# Patient Record
Sex: MALE | Race: WHITE | Employment: OTHER | URBAN - METROPOLITAN AREA
[De-identification: names, ages, dates, MRNs, and addresses within clinical notes are randomized per-mention and may not be internally consistent; named-entity substitution may affect disease eponyms.]

---

## 2019-08-04 ENCOUNTER — TRANSFERRED RECORDS (OUTPATIENT)
Dept: HEALTH INFORMATION MANAGEMENT | Facility: CLINIC | Age: 49
End: 2019-08-04

## 2019-08-05 ENCOUNTER — TRANSFERRED RECORDS (OUTPATIENT)
Dept: HEALTH INFORMATION MANAGEMENT | Facility: CLINIC | Age: 49
End: 2019-08-05

## 2019-08-05 LAB — INR PPP: 1.3 (ref 0.9–1.1)

## 2019-08-06 ENCOUNTER — TRANSFERRED RECORDS (OUTPATIENT)
Dept: HEALTH INFORMATION MANAGEMENT | Facility: CLINIC | Age: 49
End: 2019-08-06

## 2019-08-06 LAB — HBA1C MFR BLD: 8 % (ref 0–5.7)

## 2019-08-07 ENCOUNTER — TRANSFERRED RECORDS (OUTPATIENT)
Dept: HEALTH INFORMATION MANAGEMENT | Facility: CLINIC | Age: 49
End: 2019-08-07

## 2019-08-15 LAB — GLUCOSE SERPL-MCNC: 127 MG/DL (ref 70–100)

## 2019-08-16 ENCOUNTER — TRANSFERRED RECORDS (OUTPATIENT)
Dept: HEALTH INFORMATION MANAGEMENT | Facility: CLINIC | Age: 49
End: 2019-08-16

## 2019-08-16 LAB
CREAT SERPL-MCNC: 0.99 MG/DL (ref 0.73–1.18)
GFR SERPL CREATININE-BSD FRML MDRD: >60 ML/MIN/1.73M2
POTASSIUM SERPL-SCNC: 3.9 MMOL/L (ref 3.5–5.1)

## 2019-08-28 ENCOUNTER — TRANSFERRED RECORDS (OUTPATIENT)
Dept: HEALTH INFORMATION MANAGEMENT | Facility: CLINIC | Age: 49
End: 2019-08-28

## 2019-09-18 ENCOUNTER — TRANSFERRED RECORDS (OUTPATIENT)
Dept: HEALTH INFORMATION MANAGEMENT | Facility: CLINIC | Age: 49
End: 2019-09-18

## 2019-10-09 ENCOUNTER — OFFICE VISIT (OUTPATIENT)
Dept: FAMILY MEDICINE | Facility: CLINIC | Age: 49
End: 2019-10-09
Payer: COMMERCIAL

## 2019-10-09 VITALS
BODY MASS INDEX: 38.36 KG/M2 | HEIGHT: 76 IN | SYSTOLIC BLOOD PRESSURE: 138 MMHG | DIASTOLIC BLOOD PRESSURE: 80 MMHG | TEMPERATURE: 98.5 F | RESPIRATION RATE: 24 BRPM | WEIGHT: 315 LBS | HEART RATE: 80 BPM | OXYGEN SATURATION: 95 %

## 2019-10-09 DIAGNOSIS — D12.6 TUBULAR ADENOMA OF COLON: ICD-10-CM

## 2019-10-09 DIAGNOSIS — Z23 NEED FOR VACCINATION: ICD-10-CM

## 2019-10-09 DIAGNOSIS — S31.109S OPEN ABDOMINAL WALL WOUND, SEQUELA: ICD-10-CM

## 2019-10-09 DIAGNOSIS — M1A.9XX0 CHRONIC GOUT WITHOUT TOPHUS, UNSPECIFIED CAUSE, UNSPECIFIED SITE: Primary | ICD-10-CM

## 2019-10-09 DIAGNOSIS — R73.03 PREDIABETES: ICD-10-CM

## 2019-10-09 DIAGNOSIS — Z23 NEED FOR PROPHYLACTIC VACCINATION AND INOCULATION AGAINST INFLUENZA: ICD-10-CM

## 2019-10-09 DIAGNOSIS — K63.1 PERFORATION OF COLON (H): ICD-10-CM

## 2019-10-09 PROBLEM — E11.9 TYPE 2 DIABETES MELLITUS WITHOUT COMPLICATION, WITHOUT LONG-TERM CURRENT USE OF INSULIN (H): Status: ACTIVE | Noted: 2019-10-09

## 2019-10-09 LAB
ALBUMIN SERPL-MCNC: 3.1 G/DL (ref 3.4–5)
ALP SERPL-CCNC: 63 U/L (ref 40–150)
ALT SERPL W P-5'-P-CCNC: 26 U/L (ref 0–70)
ANION GAP SERPL CALCULATED.3IONS-SCNC: 4 MMOL/L (ref 3–14)
AST SERPL W P-5'-P-CCNC: 26 U/L (ref 0–45)
BILIRUB SERPL-MCNC: 0.3 MG/DL (ref 0.2–1.3)
BUN SERPL-MCNC: 13 MG/DL (ref 7–30)
CALCIUM SERPL-MCNC: 8.8 MG/DL (ref 8.5–10.1)
CHLORIDE SERPL-SCNC: 106 MMOL/L (ref 94–109)
CHOLEST SERPL-MCNC: 154 MG/DL
CO2 SERPL-SCNC: 28 MMOL/L (ref 20–32)
CREAT SERPL-MCNC: 0.72 MG/DL (ref 0.66–1.25)
GFR SERPL CREATININE-BSD FRML MDRD: >90 ML/MIN/{1.73_M2}
GLUCOSE SERPL-MCNC: 115 MG/DL (ref 70–99)
HBA1C MFR BLD: 5.8 % (ref 0–5.6)
HDLC SERPL-MCNC: 50 MG/DL
LDLC SERPL CALC-MCNC: 78 MG/DL
NONHDLC SERPL-MCNC: 104 MG/DL
POTASSIUM SERPL-SCNC: 4 MMOL/L (ref 3.4–5.3)
PROT SERPL-MCNC: 7.8 G/DL (ref 6.8–8.8)
SODIUM SERPL-SCNC: 138 MMOL/L (ref 133–144)
TRIGL SERPL-MCNC: 129 MG/DL
TSH SERPL DL<=0.005 MIU/L-ACNC: 3.08 MU/L (ref 0.4–4)
URATE SERPL-MCNC: 7.6 MG/DL (ref 3.5–7.2)

## 2019-10-09 PROCEDURE — 84550 ASSAY OF BLOOD/URIC ACID: CPT | Performed by: FAMILY MEDICINE

## 2019-10-09 PROCEDURE — 36415 COLL VENOUS BLD VENIPUNCTURE: CPT | Performed by: FAMILY MEDICINE

## 2019-10-09 PROCEDURE — 84443 ASSAY THYROID STIM HORMONE: CPT | Performed by: FAMILY MEDICINE

## 2019-10-09 PROCEDURE — 83036 HEMOGLOBIN GLYCOSYLATED A1C: CPT | Performed by: FAMILY MEDICINE

## 2019-10-09 PROCEDURE — 90471 IMMUNIZATION ADMIN: CPT | Performed by: FAMILY MEDICINE

## 2019-10-09 PROCEDURE — 90682 RIV4 VACC RECOMBINANT DNA IM: CPT | Performed by: FAMILY MEDICINE

## 2019-10-09 PROCEDURE — 80061 LIPID PANEL: CPT | Performed by: FAMILY MEDICINE

## 2019-10-09 PROCEDURE — 90715 TDAP VACCINE 7 YRS/> IM: CPT | Performed by: FAMILY MEDICINE

## 2019-10-09 PROCEDURE — 99204 OFFICE O/P NEW MOD 45 MIN: CPT | Mod: 25 | Performed by: FAMILY MEDICINE

## 2019-10-09 PROCEDURE — 80053 COMPREHEN METABOLIC PANEL: CPT | Performed by: FAMILY MEDICINE

## 2019-10-09 PROCEDURE — 90472 IMMUNIZATION ADMIN EACH ADD: CPT | Performed by: FAMILY MEDICINE

## 2019-10-09 RX ORDER — L. ACIDOPHILUS/PECTIN, CITRUS 25MM-100MG
1 TABLET ORAL
COMMUNITY
End: 2019-12-18

## 2019-10-09 RX ORDER — INDOMETHACIN 50 MG/1
50 CAPSULE ORAL 3 TIMES DAILY PRN
Qty: 90 CAPSULE | Refills: 1 | Status: SHIPPED | OUTPATIENT
Start: 2019-10-09

## 2019-10-09 RX ORDER — INDOMETHACIN 50 MG/1
50 CAPSULE ORAL 3 TIMES DAILY PRN
COMMUNITY
End: 2019-10-09

## 2019-10-09 ASSESSMENT — MIFFLIN-ST. JEOR: SCORE: 3206.81

## 2019-10-09 NOTE — LETTER
[facility]  [address] [facility] [address]  Phone: [phone]      10/10/2019     Jamel Louis  [address] [address]      Dear Jamel:    Thank you for allowing me to participate in your care. Your recent test results were reviewed and listed below.      Your results are provided below for your review    Results for orders placed or performed in visit on 10/09/19   Hemoglobin A1c   Result Value Ref Range    Hemoglobin A1C 5.8 (H) 0 - 5.6 %   Lipid panel reflex to direct LDL Non-fasting   Result Value Ref Range    Cholesterol 154 <200 mg/dL    Triglycerides 129 <150 mg/dL    HDL Cholesterol 50 >39 mg/dL    LDL Cholesterol Calculated 78 <100 mg/dL    Non HDL Cholesterol 104 <130 mg/dL   Comprehensive metabolic panel   Result Value Ref Range    Sodium 138 133 - 144 mmol/L    Potassium 4.0 3.4 - 5.3 mmol/L    Chloride 106 94 - 109 mmol/L    Carbon Dioxide 28 20 - 32 mmol/L    Anion Gap 4 3 - 14 mmol/L    Glucose 115 (H) 70 - 99 mg/dL    Urea Nitrogen 13 7 - 30 mg/dL    Creatinine 0.72 0.66 - 1.25 mg/dL    GFR Estimate >90 >60 mL/min/[1.73_m2]    GFR Estimate If Black >90 >60 mL/min/[1.73_m2]    Calcium 8.8 8.5 - 10.1 mg/dL    Bilirubin Total 0.3 0.2 - 1.3 mg/dL    Albumin 3.1 (L) 3.4 - 5.0 g/dL    Protein Total 7.8 6.8 - 8.8 g/dL    Alkaline Phosphatase 63 40 - 150 U/L    ALT 26 0 - 70 U/L    AST 26 0 - 45 U/L   TSH with free T4 reflex   Result Value Ref Range    TSH 3.08 0.40 - 4.00 mU/L   Uric acid   Result Value Ref Range    Uric Acid 7.6 (H) 3.5 - 7.2 mg/dL       Labs look overall good.   HgbA1c is down to 5.8% which is great improvement!     Uric acid is elevated at 7.6%.  If you're having gouty attacks more than a few times a year, it may be beneficial to be on Allopurinol again.     Kidney and liver function are normal, which is great!       Lipids look okay, I would not start medication at this time.            Thank you for choosing [facility]. As a  result, please continue with the treatment plan discussed in the office. Return as discussed or sooner if symptoms worsen or fail to improve.     If you have any further questions or concerns, please do not hesitate to contact us.      Sincerely,        Dr. Patricia Pedraza/otilio

## 2019-10-09 NOTE — PATIENT INSTRUCTIONS
Losing Weight:   QUICK START PATIENT GUIDE TO LCHF/IF (Ketogenic diet)  What is this diet and how does it work?  o Insulin is a hormone made by your body that allows you to use sugar (glucose) from carbohydrates in the food you eat for energy or to store glucose (as fat) for future use   o Insulin levels need to be lowered in order to utilize our stored energy (fat)  o Many struggling with obesity are insulin resistant and have high levels of insulin  o This diet works to lower your insulin in two ways  o Fasting - allows your insulin levels to naturally decrease   o Avoiding carbohydrates - carbs trigger increase in insulin  Low Carb Healthy Fat (LCHF)  o Get a free jackson for your phone, such as [employer], to help you track your macronutrients (carbs/protein/fats) and to track your weight and body measurements to see your progress  o Set your goal for around 10% carbs/20% protein/70% fat  o A good starting goal for amount of net carbs per day is 50 grams (some will aim for 20 grams)  o  Net carbs  refers to total grams of carbs minus grams of fiber (as fiber is not typically absorbed). For example, if a food has 5g total carb and 3g fiber, that would be 2g net carbs  o Increase healthy fats - eg. olive oil, eggs, nuts, avocado, cheese, butter, coconut, meats, fish  o Avoid high carb foods - eg. bread, pasta, potatoes, rice, cookies, soda, juice, anything sugary  o Buy full-fat ingredients (avoid low-fat versions, which often have more sugar)  o No need to count calories, but pay close attention to grams of carbs on labels  Intermittent Fasting (IF)  o  Fasting  is going a period of time without eating - it helps to stay busy and well-hydrated  o Purpose of fasting is to allow insulin levels to drop as low as possible, allowing your body to switch into fat-burning mode  o With this diet there are many approaches to fasting, but 16:8 and 24hr fasts are commonly used  o 16:8 fast, usually 5-7 days a week - Fasting  for 16 hours of the day, then eating all meals for the day over course of 8 hours.  o 24 hour fast, usually 1-3 days a week - Typically eating one meal a day, then fasting until the next day. Plenty of fluids (and some salt to help you hold onto fluids) are recommended during longer fasts.   o During fasts certain beverages are still acceptable - water, sparkling water, bone broth, black tea or coffee, or tea/coffee with small amount of heavy whipping cream  Special note for those on diabetic medications  o Discuss your medications with your physician. You may need to hold your medication or adjust to only taking when eating. Diabetics should keep close track of their blood sugars when making any changes to diet/meds, to ensure they are staying within normal limits  For more info about LCHF/IF  o Watch  Therapeutic Fasting - Solving the Two-Compartment Problem  video by Dr. Hari Baltazar on [employer] (https://www.[employer].com/watch?v=tIuj-oMN-Fk) for a great intro to these concepts  o Read  The Obesity Code  and/or  The Complete Guide to Fasting  by Dr. Hari Baltazar  o Go to www.dietdoctor.com for explanations, recipes, and infographics about foods to eat/avoid  EXAMPLES TO GET STARTED  Fasting Beverages  -water (can add   tsp Pink Himalayan salt once or twice a day to help stay hydrated for longer fasts)  -Sparkling water (such as [place] or similar; avoid any with artificial sweeteners)   -Bone broth (multiple recipes available online or can buy pre-made)  -Tea or Coffee (Adding heavy whipping cream or coconut oil to your tea or coffee can be helpful if you find yourself getting too hungry during the fasts. Can also add cinnamon for flavor. Or  bulletproof coffee. )  Low Carb Healthy Fat  Breakfast (if not fasting)  -eggs in butter or olive oil with avocado  -omelet with veggies and cheese    Lunch  -hamburger with cheese and avocado wrapped in lettuce (no bun, no ketchup)  -meat and cheese wrapped in lettuce (can dip in  mustard or olive oil/vinegar/hoyos)  -salad with meat/cheese/nuts and higher fat dressing (vinaigrette or Ranch, etc)  -tuna salad lettuce wrap  -taco meat with cheese, sour cream, guacamole, cheese over lettuce    Dinner  -steak with herb butter or Béarnaise sauce  - Fathead  pizza (uses cheese and almond flour for the dough - several recipes available online)  -roasted or grilled chicken with skin on, with low carb sauce (buffalo, garlic butter, feliz, pesto, etc)  -baked salmon with lemon butter  -chicken feliz with zucchini noodles  -Tristanian butter chicken with low carb garlic naan  -egg roll in a bowl    Side Dishes  -mashed cauliflower (homemade or available in freezer section)  -roast vegetables (green veggies that grow above ground rather than root veggies) with butter or cheese  -Caprese salad (fresh mozzarella, tomato and basil with olive oil)  -homemade low-carb coleslaw    Snacks/Desserts (try to avoid unnecessary snacking and sweets in general)  -celery or cucumber dipped in guacamole or sour cream dip  -cheese and meat slices   -raspberries with whipped cream (can make homemade with no sugar added)  -low carb Kentucky butter cake    AVOID - sugar, diet/regular soda, potatoes, breads, rice, pasta, candy, cookies, cakes, muffins, juice, high carb fruit (bananas, grapes), beer, ketchup, barbeque and other sweet sauces      Our Clinic hours are:  Mondays    7:20 am - 7 pm  Tues -  Fri  7:20 am - 5 pm    Clinic Phone: [phone]    The clinic lab opens at 7:30 am Mon - Fri and appointments are required.    [facility]. [phone]  Monday  8 am - 7pm  Tues - Fri 8 am - 5:30 pm

## 2019-10-09 NOTE — PROGRESS NOTES
"Subjective     Jamel Louis is a 48 year old male who presents to clinic today for the following health issues:    HPI   New Patient/Transfer of Care  Chief Complaint   Patient presents with     [facility]     Flu Shot     Health Maintenance     agreed to colonoscopy- Patient was hospitalized for a reputre colon and has been on homecare for the last 60 days     Imm/Inj     tdap     Imm/Inj     Flu Shot     Had colon rupture (? Appendix) in August and ended up septic.  [facility] is where he had surgery and was hospitalized x 14 days.  Has a wound - vac for midline incision.    Right hemicolectomy  14 cm of colon removed and 11 cm of small intestine  They did reconnect him at the time of surgery  Sounds like he had acute     Had a precancerous tumor incidentally found on the pathology - needs yearly colonoscopy? Was told to get in ASAP for scope, wife a bit worried about the anastomosis.   Path report:   A.  Terminal ileum and right colon, segmental resection:    Perforated colon with associated necrosis, abscess and acute serositis    Serosal adhesions    Tubular adenoma    Incidental submucosal lipoma    Perforated appendix with associated necrosis and abscess    Patent mesenteric vessels    Viable resection margins    B.  Colon, hepatic flexure, segmental resection:    Serosal adhesions and acute serositis    Patent mesenteric vessels    Viable resection margins      Just found out he was diabetic while hospitalized.  He's not checking blood sugars.  Endocrinologist thought that by the time he had follow up his numbers would be \"normal\".    They are hoping for diabetic ed referral but he has been successful so far with diet, weight loss on a low carbohydrate diet (keeping to <150gm of carbs).   Is only taking 1000 mg metformin at dinner time.  Beyond that and he has diarrhea.     He didn't doctor frequently before this episode.  Maybe had labs done 4 years ago that are reportedly \"normal\".        Social: " , works in [employer]  History reviewed. No pertinent surgical history.    Social History     Tobacco Use     Smoking status: Never Smoker     Smokeless tobacco: Never Used   Substance Use Topics     Alcohol use: Yes     Comment: occ     Drug use: Yes     Types: Marijuana       Preoperative evaluation Addendum:  Proposed Surgery/ Procedure: colonoscopy  Date of Surgery/ Procedure: 10/30/2019  Time of Surgery/ Procedure: 8 am  Hospital/Surgical Facility: [facility]     Primary Physician: Patricia Pedraza  Type of Anesthesia Anticipated: MAC    Patient has a Health Care Directive or Living Will:  NO    1. NO - Do you have a history of heart attack, stroke, stent, bypass or surgery on an artery in the head, neck, heart or legs?  2. NO - Do you ever have any pain or discomfort in your chest?  3. NO - Do you have a history of  Heart Failure?  4. NO - Are you troubled by shortness of breath when: walking on the level, up a slight hill or at night?  5. NO - Do you currently have a cold, bronchitis or other respiratory infection?  6. NO - Do you have a cough, shortness of breath or wheezing?  7. NO - Do you sometimes get pains in the calves of your legs when you walk?  8. NO - Do you or anyone in your family have previous history of blood clots?  9. NO - Do you or does anyone in your family have a serious bleeding problem such as prolonged bleeding following surgeries or cuts?  10. NO - Have you ever had problems with anemia or been told to take iron pills?  11. NO - Have you had any abnormal blood loss such as black, tarry or bloody stools, or abnormal vaginal bleeding?  12. NO - Have you ever had a blood transfusion?  13. NO - Have you or any of your relatives ever had problems with anesthesia?  15. YES - DO YOU HAVE ANY PROSTHETIC HEART VALVES? LUCIANA  15. NO - Do you have any prosthetic heart valves?  16. NO - Do you have prosthetic joints?  17. NO - Is there any chance that you may be  "pregnant?            Reviewed and updated as needed this visit by Provider  Problems         Review of Systems   ROS COMP: Constitutional, HEENT, cardiovascular, pulmonary, GI, , musculoskeletal, neuro, skin, endocrine and psych systems are negative, except as otherwise noted.      Objective    /80   Pulse 80   Temp 98.5  F (36.9  C) (Tympanic)   Resp 24   Ht 1.93 m (6' 4\")   Wt (!) 224.5 kg (495 lb)   SpO2 95%   BMI 60.25 kg/m    Body mass index is 60.25 kg/m .  Physical Exam   GENERAL: healthy, alert and no distress  EYES: Eyes grossly normal to inspection, PERRL and conjunctivae and sclerae normal  HENT: ear canals and TM's normal, nose and mouth without ulcers or lesions  NECK: no adenopathy, no asymmetry, masses, or scars and thyroid normal to palpation  RESP: lungs clear to auscultation - no rales, rhonchi or wheezes  CV: regular rate and rhythm, normal S1 S2, no S3 or S4, no murmur, click or rub, no peripheral edema and peripheral pulses strong  ABDOMEN: morbidly obese, midline incision with wound vac in place - spans 10-12 cm or so and is about 3-4 cm wide.    MS: no gross musculoskeletal defects noted, no edema    Diagnostic Test Results:  Labs reviewed in Epic        Assessment & Plan     1. Prediabetes.  One time HgbA1c of 8%.  With diet/exercise and metformin is now 5.8%.     significantly improved.   Discussed low carb diet, ongoing weight loss.  He may be able to reverse the diabetes.   - indomethacin (INDOCIN) 50 MG capsule; Take 1 capsule (50 mg) by mouth 3 times daily as needed for moderate pain  Dispense: 90 capsule; Refill: 1  - Hemoglobin A1c  - Lipid panel reflex to direct LDL Non-fasting  - Comprehensive metabolic panel  - TSH with free T4 reflex  - AMBULATORY ADULT DIABETES EDUCATOR REFERRAL    2. Chronic gout without tophus, unspecified cause, unspecified site   prn indomethacin  - Uric acid    3. Perforation of colon (H)  Has wound vac in place, getting home care  Needs GI " "consult and colonoscopy  - GASTROENTEROLOGY ADULT REF CONSULT ONLY    4. Tubular adenoma of colon  Needs colonoscopy    5. Need for vaccination     - TDAP VACCINE (ADACEL) [16915.002]  - 1st  Administration  [10763]    6. Need for prophylactic vaccination and inoculation against influenza     - INFLUENZA QUAD, RECOMBINANT, P-FREE (RIV4) (FLUBLOCK) [12472]  - Vaccine Administration, Each Additional [81517]    7. Open abdominal wall wound, sequela  Has wound vac, seems to be healing nicely    8.  Preoperative evaluation/assessment  Patient is medically optimized and cleared for colonoscopy  Should hold his metformin while fasting  Other medications/supplements may be continued as ordered.    10/28/2019 Patricia Pedraza M.D.         BMI:   Estimated body mass index is 60.25 kg/m  as calculated from the following:    Height as of this encounter: 1.93 m (6' 4\").    Weight as of this encounter: 224.5 kg (495 lb).   Weight management plan: Discussed healthy diet and exercise guidelines            Return in about 3 months (around 1/9/2020) for diabetes recheck.    Patricia Pedraza MD  [facility]      "

## 2019-10-10 ENCOUNTER — TELEPHONE (OUTPATIENT)
Dept: FAMILY MEDICINE | Facility: CLINIC | Age: 49
End: 2019-10-10

## 2019-10-10 NOTE — TELEPHONE ENCOUNTER
Diabetes Education Scheduling Outreach #1:    Call to patient to schedule. Left message with phone number to call to schedule.    Plan for 2nd outreach attempt within 1 week.    Ana Dove  [employer]  Diabetes and Nutrition Scheduling

## 2019-10-10 NOTE — RESULT ENCOUNTER NOTE
Labs look overall good.   HgbA1c is down to 5.8% which is great improvement!    Uric acid is elevated at 7.6%.  If you're having gouty attacks more than a few times a year, it may be beneficial to be on Allopurinol again.     Kidney and liver function are normal, which is great!      Lipids look okay, I would not start medication at this time.     Patricia Pedraza M.D.

## 2019-10-11 ENCOUNTER — TELEPHONE (OUTPATIENT)
Dept: FAMILY MEDICINE | Facility: CLINIC | Age: 49
End: 2019-10-11

## 2019-10-11 DIAGNOSIS — D12.6 TUBULAR ADENOMA OF COLON: ICD-10-CM

## 2019-10-11 DIAGNOSIS — K63.1 PERFORATION OF COLON (H): Primary | ICD-10-CM

## 2019-10-11 NOTE — TELEPHONE ENCOUNTER
Wife states that the [facility] that Dr Pedraza wanted Jamel to have his colonoscopy at due to a previous colon perforation is covered but because his BMI is so high they won't do it at their clinic and need to do it at a hospital. They want him to have it at [facility] this coming Wednesday, but [facility]  is Tier 3 for their insurance and would cost them $5000. They could have one at [facility] [address] but they do not want to do that either. Because [facility] doesn't do many in the hospital they have limited OR times. She is wondering if they can just have a general surgeon do this so they can do it through [facility] for our insurance to cover? Thank you!    Ruth Nelson RN

## 2019-10-11 NOTE — TELEPHONE ENCOUNTER
Reason for Call: Request for an order or referral:    Referral being requested: Referral for Colonoscopy-  Date needed: as soon as possible    Has the patient been seen by the PCP for this problem? YES    Additional comments: [facility] provider Dr. Pedraza recommended is in network, facility is out of network. Want new referral      Phone number Patient's wife can be reached at: [phone]    Best Time:  Any    Can we leave a detailed message on this number?  YES    Call taken on 10/11/2019 at 12:19 PM by Halima Gunn

## 2019-10-11 NOTE — TELEPHONE ENCOUNTER
Ordered to be done at the [facility].  Give patient/wife the number to call/schedule.  I believe it will be done by GI at the [facility] rather than general surgery at [place].    Patricia Pedraza M.D.

## 2019-10-18 ENCOUNTER — TELEPHONE (OUTPATIENT)
Dept: FAMILY MEDICINE | Facility: CLINIC | Age: 49
End: 2019-10-18

## 2019-10-18 NOTE — TELEPHONE ENCOUNTER
Left message for Paolo Hitchcock, to return call to clinic.  VM was not identified as secure.  Morelia LR RN

## 2019-10-18 NOTE — TELEPHONE ENCOUNTER
Reason for Call: Request for an order or referral:    Order or referral being requested: Coretta with [facility] calling requesting verbal orders for Re certification of Home care skilled nursing visits 3 times per week for 9 weeks for [employer] wound care vac and dressing change.      Date needed: as soon as possible    Has the patient been seen by the PCP for this problem? Not Applicable    Additional comments:     Phone number Patient can be reached at:  Other phone number:  [phone]*    Best Time:  any    Can we leave a detailed message on this number?  YES    Call taken on 10/18/2019 at 10:36 AM by Jennifer Nielson

## 2019-10-21 ENCOUNTER — TELEPHONE (OUTPATIENT)
Dept: GASTROENTEROLOGY | Facility: CLINIC | Age: 49
End: 2019-10-21

## 2019-10-25 ENCOUNTER — TELEPHONE (OUTPATIENT)
Dept: FAMILY MEDICINE | Facility: CLINIC | Age: 49
End: 2019-10-25

## 2019-10-25 NOTE — TELEPHONE ENCOUNTER
Dr. Pedraza,   Preop questions asked and put on your desk for review. Preop needs to be faxed to number below, patient would like call back to ensure this has been completed, patient is aware PCP is out today and returns next Monday, thank you.    FLAVIO Diaz

## 2019-10-25 NOTE — TELEPHONE ENCOUNTER
Can someone go through the preoperative evaluation questions with patient over the phone?  I can addend the last visit when I am in clinic next week.    We need to get information that we would typically get on a preoperative evaluation (where is the procedure being done, etc).      Patricia Pedraza M.D.

## 2019-10-28 NOTE — TELEPHONE ENCOUNTER
Visit updated with preoperative evaluation information and should be faxed.  Given to station .    Patricia Pedraza M.D.

## 2019-10-29 ASSESSMENT — MIFFLIN-ST. JEOR: SCORE: 3196.81

## 2019-10-30 ENCOUNTER — TRANSFERRED RECORDS (OUTPATIENT)
Dept: MULTI SPECIALTY CLINIC | Facility: CLINIC | Age: 49
End: 2019-10-30

## 2019-10-30 ENCOUNTER — ANESTHESIA - HEALTHEAST (OUTPATIENT)
Dept: SURGERY | Facility: CLINIC | Age: 49
End: 2019-10-30

## 2019-10-30 ENCOUNTER — SURGERY - HEALTHEAST (OUTPATIENT)
Dept: SURGERY | Facility: CLINIC | Age: 49
End: 2019-10-30

## 2019-10-30 ENCOUNTER — MEDICAL CORRESPONDENCE (OUTPATIENT)
Dept: HEALTH INFORMATION MANAGEMENT | Facility: CLINIC | Age: 49
End: 2019-10-30

## 2019-10-30 DIAGNOSIS — Z53.9 DIAGNOSIS NOT YET DEFINED: Primary | ICD-10-CM

## 2019-10-30 PROCEDURE — G0179 MD RECERTIFICATION HHA PT: HCPCS | Performed by: FAMILY MEDICINE

## 2019-10-30 ASSESSMENT — MIFFLIN-ST. JEOR: SCORE: 3125.36

## 2019-11-22 ENCOUNTER — TELEPHONE (OUTPATIENT)
Dept: FAMILY MEDICINE | Facility: CLINIC | Age: 49
End: 2019-11-22

## 2019-11-22 NOTE — TELEPHONE ENCOUNTER
"S-(situation): Spouse calling with ongoing issues with stool since colonoscopy. Stopped metformin and some other OTC meds.    B-(background): Pt had colonoscopy at [facility] 10/30/19. Last OV (and only) with Dr. Pedraza was 10/9/19. Pt had right hemicolectomy in August 2019.    A-(assessment):   Pt was taking metformin 500mg, one tablet by mouth twice daily. He has been off it for 7 days.  Spouse reports two days after stopping the metformin: \"he felt normal.\" Stools returned to normal.  Wife says she stopped Fiber caps, Senna-S, Benadryl, MVI and Benadryl.  He only takes indomethacin when he has a flare up, he is not taking now.   He is taking Probiotic and Zyrtec daily.  Last night and today he is \"back to some soft stool again.\"  They have ate out a couple times and he had a root beer float, and \"a couple ice creams here and there.\"  No fever, no pain, \"he's still active.\"   The colonoscopy came back \"fabulous\".  Before the colonoscopy his \"bowel regimen was perfect.\"   Regarding the stools: \"They're definitely not C-Diffy.\"  He is not following with an endocrinologist any longer.     R-(recommendations): Routing to Dr. Pedraza to review and advise. Spouse is wondering what to do next. Restart metformin?     Lida SANCHEZ RN        "

## 2019-11-22 NOTE — TELEPHONE ENCOUNTER
Reason for call:  Patient reporting a symptom    Symptom or request: Pt spouse calling stating pt has had loose stool since colonoscopy, 6-8 episodes per day, he was feeling like he did when he was taking too much metformin, so he discontinued it and was feeling better but now is having loose stool again, about one episode per day, no fever or pain. Please advise    Duration (how long have symptoms been present): weeks    Have you been treated for this before? No    Additional comments:     Phone Number patient can be reached at:  Home number on file [phone] (home) [place]    Best Time:  any    Can we leave a detailed message on this number:  YES    Call taken on 11/22/2019 at 8:18 AM by Jennifer Nielson

## 2019-11-22 NOTE — TELEPHONE ENCOUNTER
Can hold the metformin until stools are back to normal.  Would then restart, even if only 500 mg a day.  If that is tolerated could increase to 1000 mg daily.    Sometimes we need to change the formulation of the metformin to a extended release variety.      If really not tolerated due to GI side effects, we can just recheck HgbA1c in 3 months and determine if it's still necessary to be on.  With dietary changes, weight loss, etc, it is one medication that may not be necessary long term, but it can help with the weight loss, etc.    Patricia Pedraza M.D.

## 2019-11-22 NOTE — TELEPHONE ENCOUNTER
I have attempted to contact this patient's spouse on home phone with the following results: no answer.    I have attempted to contact this patient's spouse on cell phone with the following results: no answer, left message to return my call on answering machine.    Lida SANCHEZ RN

## 2019-12-18 ENCOUNTER — TELEPHONE (OUTPATIENT)
Dept: FAMILY MEDICINE | Facility: CLINIC | Age: 49
End: 2019-12-18

## 2019-12-18 ENCOUNTER — OFFICE VISIT (OUTPATIENT)
Dept: FAMILY MEDICINE | Facility: CLINIC | Age: 49
End: 2019-12-18
Payer: COMMERCIAL

## 2019-12-18 VITALS
RESPIRATION RATE: 20 BRPM | WEIGHT: 315 LBS | DIASTOLIC BLOOD PRESSURE: 80 MMHG | HEIGHT: 76 IN | OXYGEN SATURATION: 96 % | BODY MASS INDEX: 38.36 KG/M2 | TEMPERATURE: 98.2 F | SYSTOLIC BLOOD PRESSURE: 128 MMHG | HEART RATE: 68 BPM

## 2019-12-18 DIAGNOSIS — E66.01 MORBID OBESITY (H): ICD-10-CM

## 2019-12-18 DIAGNOSIS — E11.9 TYPE 2 DIABETES MELLITUS WITHOUT COMPLICATION, WITHOUT LONG-TERM CURRENT USE OF INSULIN (H): Primary | ICD-10-CM

## 2019-12-18 LAB — HBA1C MFR BLD: 6 % (ref 0–5.6)

## 2019-12-18 PROCEDURE — 36415 COLL VENOUS BLD VENIPUNCTURE: CPT | Performed by: FAMILY MEDICINE

## 2019-12-18 PROCEDURE — 83036 HEMOGLOBIN GLYCOSYLATED A1C: CPT | Performed by: FAMILY MEDICINE

## 2019-12-18 PROCEDURE — 99214 OFFICE O/P EST MOD 30 MIN: CPT | Performed by: FAMILY MEDICINE

## 2019-12-18 ASSESSMENT — PAIN SCALES - GENERAL: PAINLEVEL: NO PAIN (0)

## 2019-12-18 ASSESSMENT — MIFFLIN-ST. JEOR: SCORE: 3124.69

## 2019-12-18 NOTE — PATIENT INSTRUCTIONS
Our Clinic hours are:  Mondays    7:20 am - 7 pm  Tues -  Fri  7:20 am - 5 pm    Clinic Phone: [phone]    The clinic lab opens at 7:30 am Mon - Fri and appointments are required.    [facility]. [phone]  Monday  8 am - 7pm  Tues - Fri 8 am - 5:30 pm

## 2019-12-18 NOTE — PROGRESS NOTES
"Subjective     Jamel Louis is a 49 year old male who presents to clinic today for the following health issues:    HPI   Chief Complaint   Patient presents with     Diabetes     Medication Reconciliation     stopped metformin     Medication Reconciliation     only takes indomethacin as needed per flare up       Diabetes Follow-up      How often are you checking your blood sugar? Not at all    What concerns do you have today about your diabetes? None     Do you have any of these symptoms? (Select all that apply)  No numbness or tingling in feet.  No redness, sores or blisters on feet.  No complaints of excessive thirst.  No reports of blurry vision.  No significant changes to weight.     Have you had a diabetic eye exam in the last 12 months? No     Has been eating lower carb diet but not keto - not sure he \"wants to live like that\".   Has looked at diet doctor website  Has not read Obesity Code      His wound vac is almost done - still has homecare coming.  That is healing well    Had colonoscopy and that was good.     Diabetes Management Resources    Hyperlipidemia Follow-Up      Are you regularly taking any medication or supplement to lower your cholesterol?   No    Are you having muscle aches or other side effects that you think could be caused by your cholesterol lowering medication?  No    Hypertension Follow-up      Do you check your blood pressure regularly outside of the clinic? Yes     Are you following a low salt diet? Yes    Are your blood pressures ever more than 140 on the top number (systolic) OR more   than 90 on the bottom number (diastolic), for example 140/90? No    BP Readings from Last 2 Encounters:   12/18/19 128/80   10/09/19 138/80     Hemoglobin A1C (%)   Date Value   12/18/2019 6.0 (H)   10/09/2019 5.8 (H)     LDL Cholesterol Calculated (mg/dL)   Date Value   10/09/2019 78         How many servings of fruits and vegetables do you eat daily?  2-3    On average, how many sweetened " "beverages do you drink each day (Examples: soda, juice, sweet tea, etc.  Do NOT count diet or artificially sweetened beverages)?   0  How many days per week do you miss taking your medication? 7    What makes it hard for you to take your medications?  side effects diarrhea and just stopped taking    Wt Readings from Last 5 Encounters:   12/18/19 (!) 216.8 kg (478 lb)   10/09/19 (!) 224.5 kg (495 lb)         Reviewed and updated as needed this visit by Provider         Review of Systems   ROS COMP: Constitutional, HEENT, cardiovascular, pulmonary, gi and gu systems are negative, except as otherwise noted.      Objective    /80   Pulse 68   Temp 98.2  F (36.8  C) (Tympanic)   Resp 20   Ht 1.93 m (6' 4\")   Wt (!) 216.8 kg (478 lb)   SpO2 96%   BMI 58.18 kg/m    Body mass index is 58.18 kg/m .  Physical Exam   GENERAL: healthy, alert and no distress  NECK: no adenopathy, no asymmetry, masses, or scars and thyroid normal to palpation  RESP: lungs clear to auscultation - no rales, rhonchi or wheezes  CV: regular rate and rhythm, normal S1 S2, no S3 or S4, no murmur, click or rub, no peripheral edema and peripheral pulses strong  ABDOMEN: soft, nontender, without hepatosplenomegaly or masses, bowel sounds normal and wound vac in place  MS: no gross musculoskeletal defects noted, no edema    Diagnostic Test Results:  Labs reviewed in Epic    Results for orders placed or performed in visit on 12/18/19   Hemoglobin A1c     Status: Abnormal   Result Value Ref Range    Hemoglobin A1C 6.0 (H) 0 - 5.6 %             Assessment & Plan       ICD-10-CM    1. Type 2 diabetes mellitus without complication, without long-term current use of insulin (H) E11.9 Hemoglobin A1c   2. Morbid obesity (H) E66.01      Well controlled diabetes with diet.    Wants to remain off medications.   No need for statin at this time.        Return in about 6 months (around 6/18/2020) for diabetes recheck.    Patricia Pedraza MD  [facility] " [place]

## 2019-12-18 NOTE — TELEPHONE ENCOUNTER
1x 1 weeek  2x week 3  3 prn    Reason for Call:  Home Health Care    Davina with [facility] called regarding (reason for call): Please call Davina with verbal orders    Orders are needed for this patient.     Skilled Nursinx a week in week 1, 2x a week in week 3 and 3 prn visits.      Pt Provider: Milo    Phone Number Homecare Nurse can be reached at: [phone]    Can we leave a detailed message on this number? YES    Phone number patient can be reached at: Home number on file [phone] (home)    Best Time: any    Call taken on 2019 at 10:48 AM by Yazmin Todd

## 2019-12-19 ENCOUNTER — MEDICAL CORRESPONDENCE (OUTPATIENT)
Dept: HEALTH INFORMATION MANAGEMENT | Facility: CLINIC | Age: 49
End: 2019-12-19

## 2019-12-26 ENCOUNTER — MEDICAL CORRESPONDENCE (OUTPATIENT)
Dept: HEALTH INFORMATION MANAGEMENT | Facility: CLINIC | Age: 49
End: 2019-12-26

## 2020-01-02 DIAGNOSIS — Z53.9 DIAGNOSIS NOT YET DEFINED: Primary | ICD-10-CM

## 2020-01-02 PROCEDURE — G0179 MD RECERTIFICATION HHA PT: HCPCS | Performed by: FAMILY MEDICINE

## 2020-03-11 ENCOUNTER — HEALTH MAINTENANCE LETTER (OUTPATIENT)
Age: 50
End: 2020-03-11

## 2020-12-18 ENCOUNTER — OFFICE VISIT (OUTPATIENT)
Dept: FAMILY MEDICINE | Facility: CLINIC | Age: 50
End: 2020-12-18
Payer: COMMERCIAL

## 2020-12-18 VITALS
SYSTOLIC BLOOD PRESSURE: 134 MMHG | DIASTOLIC BLOOD PRESSURE: 78 MMHG | OXYGEN SATURATION: 97 % | HEIGHT: 76 IN | BODY MASS INDEX: 38.36 KG/M2 | WEIGHT: 315 LBS | HEART RATE: 63 BPM | TEMPERATURE: 97.4 F | RESPIRATION RATE: 20 BRPM

## 2020-12-18 DIAGNOSIS — E11.9 TYPE 2 DIABETES MELLITUS WITHOUT COMPLICATION, WITHOUT LONG-TERM CURRENT USE OF INSULIN (H): ICD-10-CM

## 2020-12-18 DIAGNOSIS — Z00.00 ROUTINE GENERAL MEDICAL EXAMINATION AT A HEALTH CARE FACILITY: Primary | ICD-10-CM

## 2020-12-18 DIAGNOSIS — M1A.9XX0 CHRONIC GOUT WITHOUT TOPHUS, UNSPECIFIED CAUSE, UNSPECIFIED SITE: ICD-10-CM

## 2020-12-18 LAB
ALBUMIN SERPL-MCNC: 3.4 G/DL (ref 3.4–5)
ALP SERPL-CCNC: 80 U/L (ref 40–150)
ALT SERPL W P-5'-P-CCNC: 29 U/L (ref 0–70)
ANION GAP SERPL CALCULATED.3IONS-SCNC: 6 MMOL/L (ref 3–14)
AST SERPL W P-5'-P-CCNC: 16 U/L (ref 0–45)
BILIRUB SERPL-MCNC: 0.3 MG/DL (ref 0.2–1.3)
BUN SERPL-MCNC: 20 MG/DL (ref 7–30)
CALCIUM SERPL-MCNC: 9.2 MG/DL (ref 8.5–10.1)
CHLORIDE SERPL-SCNC: 105 MMOL/L (ref 94–109)
CHOLEST SERPL-MCNC: 170 MG/DL
CO2 SERPL-SCNC: 26 MMOL/L (ref 20–32)
CREAT SERPL-MCNC: 0.88 MG/DL (ref 0.66–1.25)
CREAT UR-MCNC: 100 MG/DL
GFR SERPL CREATININE-BSD FRML MDRD: >90 ML/MIN/{1.73_M2}
GLUCOSE SERPL-MCNC: 118 MG/DL (ref 70–99)
HBA1C MFR BLD: 6.3 % (ref 0–5.6)
HDLC SERPL-MCNC: 44 MG/DL
LDLC SERPL CALC-MCNC: 76 MG/DL
MICROALBUMIN UR-MCNC: <5 MG/L
MICROALBUMIN/CREAT UR: NORMAL MG/G CR (ref 0–17)
NONHDLC SERPL-MCNC: 126 MG/DL
POTASSIUM SERPL-SCNC: 4.2 MMOL/L (ref 3.4–5.3)
PROT SERPL-MCNC: 7.7 G/DL (ref 6.8–8.8)
SODIUM SERPL-SCNC: 137 MMOL/L (ref 133–144)
TRIGL SERPL-MCNC: 249 MG/DL
URATE SERPL-MCNC: 7.5 MG/DL (ref 3.5–7.2)

## 2020-12-18 PROCEDURE — 80053 COMPREHEN METABOLIC PANEL: CPT | Performed by: FAMILY MEDICINE

## 2020-12-18 PROCEDURE — 82043 UR ALBUMIN QUANTITATIVE: CPT | Performed by: FAMILY MEDICINE

## 2020-12-18 PROCEDURE — 99396 PREV VISIT EST AGE 40-64: CPT | Performed by: FAMILY MEDICINE

## 2020-12-18 PROCEDURE — 80061 LIPID PANEL: CPT | Performed by: FAMILY MEDICINE

## 2020-12-18 PROCEDURE — 36415 COLL VENOUS BLD VENIPUNCTURE: CPT | Performed by: FAMILY MEDICINE

## 2020-12-18 PROCEDURE — 83036 HEMOGLOBIN GLYCOSYLATED A1C: CPT | Performed by: FAMILY MEDICINE

## 2020-12-18 PROCEDURE — 84550 ASSAY OF BLOOD/URIC ACID: CPT | Performed by: FAMILY MEDICINE

## 2020-12-18 PROCEDURE — 99213 OFFICE O/P EST LOW 20 MIN: CPT | Mod: 25 | Performed by: FAMILY MEDICINE

## 2020-12-18 RX ORDER — ALLOPURINOL 100 MG/1
100 TABLET ORAL DAILY
Qty: 90 TABLET | Refills: 3 | Status: SHIPPED | OUTPATIENT
Start: 2020-12-18 | End: 2021-09-23

## 2020-12-18 ASSESSMENT — MIFFLIN-ST. JEOR: SCORE: 3174.13

## 2020-12-18 ASSESSMENT — PAIN SCALES - GENERAL: PAINLEVEL: NO PAIN (0)

## 2020-12-18 NOTE — PROGRESS NOTES
3  SUBJECTIVE:   CC: Jamel Louis is an 50 year old male who presents for preventive health visit.     Chief Complaint   Patient presents with     Physical     '  Patient has been advised of split billing requirements and indicates understanding: Yes  Healthy Habits:    Do you get at least three servings of calcium containing foods daily (dairy, green leafy vegetables, etc.)? yes    Amount of exercise or daily activities, outside of work: none    Problems taking medications regularly No    Medication side effects: No    Have you had an eye exam in the past two years? no    Do you see a dentist twice per year? no    Do you have sleep apnea, excessive snoring or daytime drowsiness?yes      Diabetes Follow-up      How often are you checking your blood sugar? Not at all    What concerns do you have today about your diabetes? None     Do you have any of these symptoms? (Select all that apply)  No numbness or tingling in feet.  No redness, sores or blisters on feet.  No complaints of excessive thirst.  No reports of blurry vision.  No significant changes to weight.    Have you had a diabetic eye exam in the last 12 months? YES          Hyperlipidemia Follow-Up      Are you regularly taking any medication or supplement to lower your cholesterol?   No    Are you having muscle aches or other side effects that you think could be caused by your cholesterol lowering medication?  No    Hypertension Follow-up      Do you check your blood pressure regularly outside of the clinic? No     Are you following a low salt diet? No    Are your blood pressures ever more than 140 on the top number (systolic) OR more   than 90 on the bottom number (diastolic), for example 140/90? No    BP Readings from Last 2 Encounters:   12/18/20 134/78   12/18/19 128/80     Hemoglobin A1C (%)   Date Value   12/18/2020 6.3 (H)   12/18/2019 6.0 (H)     LDL Cholesterol Calculated (mg/dL)   Date Value   10/09/2019 78         Today's PHQ-2 Score:  "  PHQ-2 ( 1999 [employer]) 12/18/2020 12/18/2019   Q1: Little interest or pleasure in doing things 0 0   Q2: Feeling down, depressed or hopeless 0 0   PHQ-2 Score 0 0       Abuse: Current or Past(Physical, Sexual or Emotional)- No  Do you feel safe in your environment? Yes        Social History     Tobacco Use     Smoking status: Never Smoker     Smokeless tobacco: Never Used   Substance Use Topics     Alcohol use: Yes     Comment: occ     If you drink alcohol do you typically have >3 drinks per day or >7 drinks per week? No                      Last PSA: No results found for: PSA    Reviewed orders with patient. Reviewed health maintenance and updated orders accordingly - Yes  Lab work is in process    Reviewed and updated as needed this visit by clinical staff  Tobacco  Allergies  Meds  Problems  Med Hx  Surg Hx  Fam Hx          Reviewed and updated as needed this visit by Provider                    ROS:  CONSTITUTIONAL: NEGATIVE for fever, chills, change in weight  INTEGUMENTARY/SKIN: NEGATIVE for worrisome rashes, moles or lesions  EYES: NEGATIVE for vision changes or irritation  ENT: chronic tinnitus  RESP: NEGATIVE for significant cough or SOB  CV: NEGATIVE for chest pain, palpitations or peripheral edema  GI: intermittent diarrhea- s/p partial colectomy.  Does take fiber and that helps  Discussed doing a food diary to track symptoms   male: negative for dysuria, hematuria, decreased urinary stream, erectile dysfunction, urethral discharge  MUSCULOSKELETAL: frequent gouty pain - wrists/ankles/knees. Using ibuprofen pretty regularly  NEURO: NEGATIVE for weakness, dizziness or paresthesias  PSYCHIATRIC: NEGATIVE for changes in mood or affect    OBJECTIVE:   /78   Pulse 63   Temp 97.4  F (36.3  C) (Tympanic)   Resp 20   Ht 1.93 m (6' 4\")   Wt (!) 222.3 kg (490 lb)   SpO2 97%   BMI 59.64 kg/m    EXAM:  GENERAL: healthy, alert, no distress and obese  NECK: no adenopathy, no asymmetry, masses, or " "scars and thyroid normal to palpation  RESP: lungs clear to auscultation - no rales, rhonchi or wheezes  CV: regular rate and rhythm, normal S1 S2, no S3 or S4, no murmur, click or rub, no peripheral edema and peripheral pulses strong  ABDOMEN: soft, nontender, no hepatosplenomegaly, no masses and bowel sounds normal  MS: no gross musculoskeletal defects noted, no edema    Diagnostic Test Results:  Labs reviewed in Epic  Results for orders placed or performed in visit on 12/18/20 (from the past 24 hour(s))   Hemoglobin A1c   Result Value Ref Range    Hemoglobin A1C 6.3 (H) 0 - 5.6 %       ASSESSMENT/PLAN:   1. Routine general medical examination at a health care facility       2. Type 2 diabetes mellitus without complication, without long-term current use of insulin (H)     - Albumin Random Urine Quantitative with Creat Ratio  - Comprehensive metabolic panel  - Lipid panel reflex to direct LDL Fasting  - Hemoglobin A1c    3. Chronic gout without tophus, unspecified cause, unspecified site     - Uric acid  - allopurinol (ZYLOPRIM) 100 MG tablet; Take 1 tablet (100 mg) by mouth daily  Dispense: 90 tablet; Refill: 3  - **Uric acid FUTURE 2mo; Future    Patient has been advised of split billing requirements and indicates understanding: Yes  COUNSELING:  Reviewed preventive health counseling, as reflected in patient instructions       Regular exercise       Healthy diet/nutrition    Estimated body mass index is 59.64 kg/m  as calculated from the following:    Height as of this encounter: 1.93 m (6' 4\").    Weight as of this encounter: 222.3 kg (490 lb).    Weight management plan: encouraged to read Diabetes Code and/or Obesity Code    He reports that he has never smoked. He has never used smokeless tobacco.      Counseling Resources:  ATP IV Guidelines  Pooled Cohorts Equation Calculator  FRAX Risk Assessment  ICSI Preventive Guidelines  Dietary Guidelines for Americans, 2010  USDA's MyPlate  ASA Prophylaxis  Lung CA " Screening    Patricia Pedraza MD  [facility]

## 2020-12-18 NOTE — PATIENT INSTRUCTIONS
Our Clinic hours are:  Mondays    7:20 am - 7 pm  Tues - Fri  7:20 am - 5 pm    Clinic Phone: [phone]    The clinic lab opens at 7:30 am Mon - Fri and appointments are required.    [facility]. [phone]  Monday  8 am - 7pm  Tues - Fri 8 am - 5:30 pm         Preventive Health Recommendations  Male Ages 50 - 64    Yearly exam:             See your health care provider every year in order to  o   Review health changes.   o   Discuss preventive care.    o   Review your medicines if your doctor has prescribed any.     Have a cholesterol test every 5 years, or more frequently if you are at risk for high cholesterol/heart disease.     Have a diabetes test (fasting glucose) every three years. If you are at risk for diabetes, you should have this test more often.     Have a colonoscopy at age 50, or have a yearly FIT test (stool test). These exams will check for colon cancer.      Talk with your health care provider about whether or not a prostate cancer screening test (PSA) is right for you.    You should be tested each year for STDs (sexually transmitted diseases), if you re at risk.     Shots: Get a flu shot each year. Get a tetanus shot every 10 years.     Nutrition:    Eat at least 5 servings of fruits and vegetables daily.     Eat whole-grain bread, whole-wheat pasta and brown rice instead of white grains and rice.     Get adequate Calcium and Vitamin D.     Lifestyle    Exercise for at least 150 minutes a week (30 minutes a day, 5 days a week). This will help you control your weight and prevent disease.     Limit alcohol to one drink per day.     No smoking.     Wear sunscreen to prevent skin cancer.     See your dentist every six months for an exam and cleaning.     See your eye doctor every 1 to 2 years.    Patient Education     Tinnitus (Ringing in the Ears)     Treatment may include maskers and hearing aids.   Tinnitus is the term for a noise in your ear not caused by an  outside sound. The noise might be a ringing, clicking, hiss, or roar. It can vary in pitch. It may be soft or very loud. For some people, this is a minor problem. But for others, the noise can make it hard to hear, work, and even sleep. When tinnitus can't be cured, treatments may help.   What causes tinnitus?  Loud noises, hearing loss, and earwax can cause tinnitus. So can certain medicines. Large amounts of aspirin or caffeine are sometimes to blame. In many cases, the exact cause of tinnitus is not known.   How is tinnitus treated?  Finding and removing the cause is the best way to treat tinnitus. So your healthcare provider may refer you to an ear, nose, and throat doctor (ENT or otolaryngologist). Your hearing may also be checked by a hearing specialist (audiologist). If you have hearing loss, wearing a hearing aid may help your tinnitus. When the cause can't be found, the tinnitus itself may be treated. Some of the treatments are listed below. Your healthcare provider can tell you more about them:     Maskers. These are small devices that look like hearing aids. They have a pleasant sound that helps cover up the ringing in your ears. Hearing aids and maskers are sometimes used together.    Medicines that treat anxiety and depression. These may ease tinnitus in some people.    Hypnosis or relaxation therapy. This may help head noise seem less severe.    Tinnitus retraining therapy. This combines counseling and maskers. Both can help take your mind off the tinnitus.  To learn more    American Speech-Hearing-Language Association 753-719-7983 www.mima.org    American Tinnitus Association 721-197-2157 www.josiane.org    National [place] on Deafness and other Communication Disorders 171-000-4768 www.nidcd.nih.gov/    Luis Enrique last reviewed this educational content on 9/1/2019 2000-2020 [employer]. [address] [place] [address], [place] [address]. All rights reserved. This information is not intended as a  "substitute for professional medical care. Always follow your healthcare professional's instructions.           Patient Education     Tinnitus Management Program  Tinnitus is a ringing, buzzing, or other sound in your head. About 50 million people in the [place] have tinnitus at some point in their life.   The Tinnitus Management Program offers testing, teaching, counseling and sound therapy for tinnitus. Our goal is to help you better understand your tinnitus and manage its effects.  Who is this program for?  The Tinnitus Management Program is for people whose tinnitus bothers them and lasts 3 to 6 months or longer.  How does it work?  We will set up a meeting for you with a specialist who treats hearing problems, called an audiologist (un-mek-TIY-jany-jist). This meeting is called an \"initial consultation.\"  Before this meeting you need:    The results of a recent hearing test    An OK called a \"medical clearance\" from an Ear, Nose and Throat doctor (ENT)  You will also need to fill in these 2 forms:  1. Tinnitus History Questionnaire  2. Tinnitus Handicap Inventory (THI)  What happens at the initial consultation?  Your first meeting will be a 90-minute appointment. Please feel free to bring a family member or friend. Many insurance plans cover this appointment, including Medicare.  The audiologist will:    Do more tinnitus testing, if needed.    Review your tinnitus history and questionnaire.    Talk with you about testing and treatment options.    Set up a tinnitus management program for you.    Answer your questions.  What happens in the program?  We will create a program just for you. Tinnitus management programs may include:    Tinnitus Retraining Therapy    Sound Therapy    Cognitive Behavioral Therapy    Health Psychology  Tinnitus retraining therapy  Retraining is detailed teaching to help you better understand where tinnitus comes from and how your body may react. We train you to hear your tinnitus as neutral, " "or \"white\" noise.  Sound therapy  Sound therapy may include an iPod, table-top sound device, Sound Pillow, or even using different hearing aids together.  Cognitive behavioral therapy  We will talk about ways to help you notice and change how you think about your tinnitus.   Health psychology  This is a specialty that helps people cope with the stress and worry of health problems.  Is there any follow-up?  Yes. You will have at least 1 follow up session. That way we can track your progress, give you more training, and even change your plan if needed.  Managing your tinnitus won't happen overnight. But we will strive to give you training and skills to help you cope. That way, you can focus on the important things in your life.  Will my insurance pay for the program?  Check with your insurance company. The charge code for the Tinnitus Program is 01906.  How do I get started?  To set up your first meeting with audiologist Mikayla Slater, please call:  [facility] [place] [phone]  For informational purposes only. Not to replace the advice of your health care provider. Copyright   2016 [employer]. All rights reserved. [employer] 330897 - 09/16.  For informational purposes only. Not to replace the advice of your health care provider.  Copyright   2018 [employer]. All rights reserved.           "

## 2020-12-21 NOTE — RESULT ENCOUNTER NOTE
Uric acid is high.  Recheck that in 2 months (lab only) as we just started the allopurinol 100 mg.  We may need to adjust further in the future however.     No protein in the urine which is good.     Kidney and liver function are normal.     Cholesterol is good, however LDL goal is <70 for diabetics.  Let me know if you are willing to start a statin.    Patricia Pedraza M.D.

## 2021-01-21 ENCOUNTER — OFFICE VISIT (OUTPATIENT)
Dept: FAMILY MEDICINE | Facility: CLINIC | Age: 51
End: 2021-01-21
Payer: COMMERCIAL

## 2021-01-21 VITALS
TEMPERATURE: 98.3 F | BODY MASS INDEX: 59.64 KG/M2 | RESPIRATION RATE: 20 BRPM | HEIGHT: 76 IN | DIASTOLIC BLOOD PRESSURE: 72 MMHG | OXYGEN SATURATION: 97 % | HEART RATE: 69 BPM | SYSTOLIC BLOOD PRESSURE: 138 MMHG

## 2021-01-21 DIAGNOSIS — S80.12XA LEG HEMATOMA, LEFT, INITIAL ENCOUNTER: Primary | ICD-10-CM

## 2021-01-21 PROCEDURE — 99213 OFFICE O/P EST LOW 20 MIN: CPT | Performed by: FAMILY MEDICINE

## 2021-01-21 ASSESSMENT — PAIN SCALES - GENERAL: PAINLEVEL: NO PAIN (0)

## 2021-01-21 NOTE — PROGRESS NOTES
"  Assessment & Plan     Leg hematoma, left, initial encounter  Suspect hematoma. US ordered  No need for I&D at this time and with his diabetes, I would try to avoid opening this unless it became clinically infected and then  May want this done in ER or by general surgery - I don't think this clinic would be the place to open such a large lesion of unknown source.  Warm packing is recommended.  If this is a hematoma I would suspect it will gradually improve over time.   - US Extremity Non Vascular Bilateral; Future      No follow-ups on file.    Patricia Pedraza MD  [facility]    Jessica Mccullough is a 50 year old who presents to clinic today for the following health issues     HPI   Chief Complaint   Patient presents with     Mass     Patient has a bump on left lower leg since june that has been treated with IV antibiotics. Patient has no known injury. Patient feels like the bump is getting bigger. Patient would like to rule out infection. Patient has no pain.        In June/July had an infection of the proximal left leg.  Took IM Rocephin and then oral antibiotic to clear it up (prescribed by a doctor who is a friend of his wife). Wife showed me pictures. Since that time there has been a zahira on his leg and a bit of an \"indentation\".    Last week was aisha and ended up developing a swelling in the same location.  That hasn't really changed much in size since then according to the pictures that his wife had on her phone - maybe has spread out a bit.  No fever/chills.  No redness or warmth.   They were wondering if this needed to be drained or cultured.          Review of Systems         Objective    /72   Pulse 69   Temp 98.3  F (36.8  C) (Tympanic)   Resp 20   Ht 1.93 m (6' 4\")   SpO2 97%   BMI 59.64 kg/m    Body mass index is 59.64 kg/m .  Physical Exam   GENERAL APPEARANCE: healthy, alert and no distress  SKIN: left leg proximally is swollen 11 cm wide x 8 cm " high  There is no erythema or warmth.  It is fluctuant. Non-tender on palpation.

## 2021-01-21 NOTE — PATIENT INSTRUCTIONS
Our Clinic hours are:  Mondays    7:20 am - 7 pm  Tues -  Fri  7:20 am - 5 pm    Clinic Phone: [phone]    The clinic lab opens at 7:30 am Mon - Fri and appointments are required.    [facility]. [phone]  Monday  8 am - 7pm  Tues - Fri 8 am - 5:30 pm

## 2021-04-25 ENCOUNTER — HEALTH MAINTENANCE LETTER (OUTPATIENT)
Age: 51
End: 2021-04-25

## 2021-06-02 NOTE — ANESTHESIA POSTPROCEDURE EVALUATION
Patient: Jamel Louis  COLONOSCOPY  Anesthesia type: MAC    Patient location: Phase II Recovery  Last vitals:   Vitals Value Taken Time   /78 10/30/2019 11:30 AM   Temp 36.9  C (98.5  F) 10/30/2019 11:10 AM   Pulse 84 10/30/2019 11:42 AM   Resp 20 10/30/2019 11:18 AM   SpO2 98 % 10/30/2019 11:42 AM   Vitals shown include unvalidated device data.  Post vital signs: stable  Level of consciousness: awake, alert and oriented  Post-anesthesia pain: pain controlled  Post-anesthesia nausea and vomiting: no  Pulmonary: unassisted, return to baseline  Cardiovascular: stable and blood pressure at baseline  Hydration: adequate  Anesthetic events: no    QCDR Measures:  ASA# 11 - Pamela-op Cardiac Arrest: ASA11B - Patient did NOT experience unanticipated cardiac arrest  ASA# 12 - Pamela-op Mortality Rate: ASA12B - Patient did NOT die  ASA# 13 - PACU Re-Intubation Rate: NA - No ETT / LMA used for case  ASA# 10 - Composite Anes Safety: ASA10A - No serious adverse event    Additional Notes:

## 2021-06-02 NOTE — ANESTHESIA PREPROCEDURE EVALUATION
Anesthesia Evaluation      Patient summary reviewed   No history of anesthetic complications     Airway   Mallampati: III  Neck ROM: full   Pulmonary - normal exam   (+) sleep apnea on CPAP, ,                          Cardiovascular - normal exam  Exercise tolerance: > or = 4 METS  (+) dysrhythmias (a.fib while septic, no long term anticoagulation), ,     ECG reviewed        Neuro/Psych - negative ROS     Endo/Other    (+) diabetes mellitus type 2, obesity (BMI 58),      GI/Hepatic/Renal    (-) GERD    Comments: H/o ruptured colon in August with sepsis     Other findings: 10/17/19 echo  NL LV size and systolic function, NL RV size and function  No significant valve d/o      Dental - normal exam                        Anesthesia Plan  Planned anesthetic: MAC  Propofol only.  Avoid versed and fentanyl.  ASA 3     Anesthetic plan and risks discussed with: patient and spouse  Anesthesia plan special considerations: antiemetics,   Post-op plan: routine recovery

## 2021-06-03 VITALS — BODY MASS INDEX: 38.36 KG/M2 | WEIGHT: 315 LBS | HEIGHT: 76 IN

## 2021-06-18 ENCOUNTER — OFFICE VISIT (OUTPATIENT)
Dept: FAMILY MEDICINE | Facility: CLINIC | Age: 51
End: 2021-06-18
Payer: COMMERCIAL

## 2021-06-18 ENCOUNTER — IMMUNIZATION (OUTPATIENT)
Dept: LAB | Facility: CLINIC | Age: 51
End: 2021-06-18
Payer: COMMERCIAL

## 2021-06-18 VITALS
BODY MASS INDEX: 38.36 KG/M2 | TEMPERATURE: 98.5 F | WEIGHT: 315 LBS | DIASTOLIC BLOOD PRESSURE: 74 MMHG | OXYGEN SATURATION: 95 % | RESPIRATION RATE: 20 BRPM | HEART RATE: 66 BPM | SYSTOLIC BLOOD PRESSURE: 136 MMHG | HEIGHT: 76 IN

## 2021-06-18 DIAGNOSIS — E11.9 TYPE 2 DIABETES MELLITUS WITHOUT COMPLICATION, WITHOUT LONG-TERM CURRENT USE OF INSULIN (H): ICD-10-CM

## 2021-06-18 DIAGNOSIS — H66.91 ACUTE OTITIS MEDIA OF RIGHT EAR WITH PERFORATION: Primary | ICD-10-CM

## 2021-06-18 DIAGNOSIS — H72.91 ACUTE OTITIS MEDIA OF RIGHT EAR WITH PERFORATION: Primary | ICD-10-CM

## 2021-06-18 LAB — HBA1C MFR BLD: 6.5 % (ref 0–5.6)

## 2021-06-18 PROCEDURE — 36415 COLL VENOUS BLD VENIPUNCTURE: CPT | Performed by: FAMILY MEDICINE

## 2021-06-18 PROCEDURE — 99213 OFFICE O/P EST LOW 20 MIN: CPT | Performed by: FAMILY MEDICINE

## 2021-06-18 PROCEDURE — 83036 HEMOGLOBIN GLYCOSYLATED A1C: CPT | Performed by: FAMILY MEDICINE

## 2021-06-18 RX ORDER — OFLOXACIN 3 MG/ML
5 SOLUTION AURICULAR (OTIC) DAILY
Qty: 2 ML | Refills: 0 | Status: SHIPPED | OUTPATIENT
Start: 2021-06-18 | End: 2021-06-25

## 2021-06-18 RX ORDER — CEFDINIR 300 MG/1
300 CAPSULE ORAL 2 TIMES DAILY
Qty: 20 CAPSULE | Refills: 0 | Status: SHIPPED | OUTPATIENT
Start: 2021-06-18 | End: 2021-06-28

## 2021-06-18 ASSESSMENT — MIFFLIN-ST. JEOR: SCORE: 3199.07

## 2021-06-18 ASSESSMENT — PAIN SCALES - GENERAL: PAINLEVEL: MILD PAIN (3)

## 2021-06-18 NOTE — PROGRESS NOTES
"    Assessment & Plan     Acute otitis media of right ear with perforation  Given his diabetes and the foul drainage/perforation will treat with both oral and topical antibitoics  Follow up in 2-3 weeks with me or ENT for re-evaluation of the perforation  - cefdinir (OMNICEF) 300 MG capsule; Take 1 capsule (300 mg) by mouth 2 times daily for 10 days  - ofloxacin (FLOXIN) 0.3 % otic solution; Place 5 drops into both ears daily for 7 days    Type 2 diabetes mellitus without complication, without long-term current use of insulin (H)     - Hemoglobin A1c             BMI:   Estimated body mass index is 60.31 kg/m  as calculated from the following:    Height as of this encounter: 1.93 m (6' 4\").    Weight as of this encounter: 224.8 kg (495 lb 8 oz).           No follow-ups on file.    Patricia Pedraza MD  [facility]    Jessica Mccullough is a 50 year old who presents for the following health issues  accompanied by his self:    HPI     Acute Illness  Acute illness concerns: bilateral ear pain and drainage  Onset/Duration: X 1 week  Symptoms:  Fever: no  Chills/Sweats: no  Headache (location?): YES  Sinus Pressure: YES  Conjunctivitis:  Headache behind eyes  Ear Pain: YES: bilateral, right is worse  Rhinorrhea: YES- morning  Congestion: no  Sore Throat: no  Cough: no  Wheeze: no  Decreased Appetite: no  Nausea: no  Vomiting: no  Diarrhea: no  Dysuria/Freq.: no  Dysuria or Hematuria: no  Fatigue/Achiness: YES- fatigue  Sick/Strep Exposure: no  Therapies tried and outcome: Q-tips      Not checking blood sugars  Did read the Obesity Code and has been working on dietary changes    Review of Systems   Constitutional, HEENT, cardiovascular, pulmonary, gi and gu systems are negative, except as otherwise noted.      Objective    /74   Pulse 66   Temp 98.5  F (36.9  C) (Tympanic)   Resp 20   Ht 1.93 m (6' 4\")   Wt (!) 224.8 kg (495 lb 8 oz)   SpO2 95%   BMI 60.31 kg/m    Body mass index is " 60.31 kg/m .  Physical Exam   GENERAL APPEARANCE: healthy, alert and no distress  HENT: right tympanic membrane with inferior perforation and foul discharge  Left tympanic membrane is milky in appearance but not bulging or erythematous

## 2021-06-18 NOTE — PATIENT INSTRUCTIONS
Our Clinic hours are:  Mondays    7:20 am - 7 pm  Tues -  Fri  7:20 am - 5 pm    Clinic Phone: [phone]    The clinic lab opens at 7:30 am Mon - Fri and appointments are required.    [facility]. [phone]  Monday  8 am - 7pm  Tues - Fri 8 am - 5:30 pm

## 2021-06-19 ENCOUNTER — DOCUMENTATION ONLY (OUTPATIENT)
Dept: ADMINISTRATIVE | Facility: CLINIC | Age: 51
End: 2021-06-19

## 2021-07-02 ENCOUNTER — TELEPHONE (OUTPATIENT)
Dept: FAMILY MEDICINE | Facility: CLINIC | Age: 51
End: 2021-07-02

## 2021-07-02 DIAGNOSIS — H72.90 PERFORATION OF TYMPANIC MEMBRANE, UNSPECIFIED LATERALITY: Primary | ICD-10-CM

## 2021-07-02 NOTE — TELEPHONE ENCOUNTER
Needs to either see me for re-examination of ear or see ENT as there was a perforation of tympanic membrane.    Referral signed.    Patricia Pedraza M.D.

## 2021-07-02 NOTE — PROGRESS NOTES
"S-(situation): Received call from Huyen, his wife.  Huyen states that Jamel's ear is 95% better.  Ear feels better and headache is better.  He still feels \"swish and crackle\" in his ear.  Has all day pain in ear, pain scale 1-2/10.  Pain does not limit activities of daily living.  No fever.  Asking for extension of antibiotics.    B-(background): saw Patricia Pedraza MD on 6/18/21.  Was treated with Omnicef and Olfloxacin    A-(assessment): ear better than when seen on 6/18/21, but some ear pain and noise in ear    R-(recommendations): I told Huyen would send this message to his provider for review  Cyndee Finch RN      "

## 2021-07-02 NOTE — TELEPHONE ENCOUNTER
"S-(situation): Received call from Huyen, his wife.  Huyen states that Jamel's ear is 95% better.  Ear feels better and headache is better.  He still feels \"swish and crackle\" in his ear.  Has all day pain in ear, pain scale 1-2/10.  Pain does not limit activities of daily living.  No fever.  Asking for extension of antibiotics.    B-(background): saw Patricia Pedraza MD on 6/18/21.  Was treated with Omnicef and Olfloxacin    A-(assessment): ear better than when seen on 6/18/21, but some ear pain and noise in ear    R-(recommendations): I told Huyen would send this message to his provider for review.  Asking for additional antibiotic.  Cyndee Finch RN  "

## 2021-07-03 NOTE — ADDENDUM NOTE
Addendum Note by Neelam Harding MD at 11/13/2019  9:45 PM     Author: Neelam Harding MD Service: -- Author Type: Physician    Filed: 11/13/2019  9:45 PM Date of Service: 11/13/2019  9:45 PM Status: Signed    : Neelam Harding MD (Physician)       Addendum  created 11/13/19 2145 by Neelam Harding MD    Intraprocedure Event deleted, Intraprocedure Event edited, Intraprocedure Staff edited, Order sets accessed

## 2021-07-07 NOTE — ANESTHESIA CARE TRANSFER NOTE
Last vitals:   Vitals:    10/30/19 1130   BP: 149/78   Pulse: 87   Resp:    Temp:    SpO2: 97%     Patient's level of consciousness is drowsy  Spontaneous respirations: yes  Maintains airway independently: yes  Dentition unchanged: yes  Oropharynx: oropharynx clear of all foreign objects    QCDR Measures:  ASA# 20 - Surgical Safety Checklist: WHO surgical safety checklist completed prior to induction    PQRS# 430 - Adult PONV Prevention: NA - Not adult patient, not GA or 3 or more risk factors NOT present  ASA# 8 - Peds PONV Prevention: NA - Not pediatric patient, not GA or 2 or more risk factors NOT present  PQRS# 424 - Pamela-op Temp Management: 4559F - At least one body temp DOCUMENTED => 35.5C or 95.9F within required timeframe  PQRS# 426 - PACU Transfer Protocol: - Transfer of care checklist used  ASA# 14 - Acute Post-op Pain: ASA14B - Patient did NOT experience pain >= 7 out of 10  
(4) walks frequently

## 2021-07-22 NOTE — PROGRESS NOTES
Chief Complaint   Patient presents with     Ear Problem     right ear infection  with perforation on 6/18/21 put on oral antibiotics and ear drops with no change noted- pain in ear 4/10 and drainage     History of Present Illness  Jamel Louis is a 50 year old male who presents to today for ear evaluation.   I am seeing this patient in consultation for perforation of the tympanic membrane at the request of the provider Dr. Pedraza.  Patient reports decreased hearing, pain, pressure, drainage from the right ear.  He was seen on June 18 and placed on oral antibiotics and Floxin eardrops.  He completed treatment but was still having symptoms.  He does have some intermittent otalgia on the right.  He does have some itching in the ear.  He feels like the hearing is decreased more on the right-hand side.  No dizziness or vertigo.  He has had previous ear tube placement as an adult.  He denies any other previous ear surgery.  No identifiable recent water exposure or ear canal trauma.     Past Medical History  Patient Active Problem List   Diagnosis     Open abdominal wall wound, sequela     Perforation of colon (H)     Chronic gout without tophus, unspecified cause, unspecified site     Type 2 diabetes mellitus without complication, without long-term current use of insulin (H)     Tubular adenoma of colon     Morbid obesity (H)     Current Medications     Current Outpatient Medications:      allopurinol (ZYLOPRIM) 100 MG tablet, Take 1 tablet (100 mg) by mouth daily (Patient taking differently: Take 100 mg by mouth daily as needed ), Disp: 90 tablet, Rfl: 3     ciprofloxacin-dexamethasone (CIPRODEX) 0.3-0.1 % otic suspension, Place 5 drops in draining ear twice daily for 10 days.  Call if drainage persistent past 10 days., Disp: 10 mL, Rfl: 3     indomethacin (INDOCIN) 50 MG capsule, Take 1 capsule (50 mg) by mouth 3 times daily as needed for moderate pain, Disp: 90 capsule, Rfl: 1    Allergies  No Known  Allergies    Social History   Social History     Socioeconomic History     Marital status:      Spouse name: None     Number of children: None     Years of education: None     Highest education level: None   Occupational History     None   Tobacco Use     Smoking status: Never Smoker     Smokeless tobacco: Never Used   Substance and Sexual Activity     Alcohol use: Yes     Comment: occ     Drug use: Yes     Types: Marijuana     Sexual activity: Yes     Partners: Female   Other Topics Concern     None   Social History Narrative     None     Social Determinants of Health     Financial Resource Strain:      Difficulty of Paying Living Expenses:    Food Insecurity:      Worried About Running Out of Food in the Last Year:      Ran Out of Food in the Last Year:    Transportation Needs:      Lack of Transportation (Medical):      Lack of Transportation (Non-Medical):    Physical Activity:      Days of Exercise per Week:      Minutes of Exercise per Session:    Stress:      Feeling of Stress :    Social Connections:      Frequency of Communication with Friends and Family:      Frequency of Social Gatherings with Friends and Family:      Attends Bahai Services:      Active Member of Clubs or Organizations:      Attends Club or Organization Meetings:      Marital Status:    Intimate Partner Violence:      Fear of Current or Ex-Partner:      Emotionally Abused:      Physically Abused:      Sexually Abused:        Family History  Family History   Problem Relation Age of Onset     Colon Cancer Father      Heart Disease Father      Chronic Obstructive Pulmonary Disease Father        Review of Systems  As per HPI and PMHx, otherwise 10+ comprehensive system review is negative.    Physical Exam  BP (!) 151/93 (BP Location: Right arm, Patient Position: Chair, Cuff Size: Adult Large)   Pulse 77   Temp 98.4  F (36.9  C) (Tympanic)   Wt (!) 224.5 kg (495 lb)   BMI 60.25 kg/m    GENERAL: Patient is a pleasant, cooperative  50 year old male in no acute distress.  HEAD: Normocephalic, atraumatic.  Hair and scalp are normal.  EYES: Pupils are equal, round, reactive to light and accommodation.  Extraocular movements are intact.  The sclera nonicteric without injection.  The extraocular structures are normal.  EARS: See below.   NEUROLOGIC: Cranial nerves II through XII are grossly intact.  Voice is strong.  Facial nerve examination incomplete due to the patient wearing a mask.  CARDIOVASCULAR: Extremities are warm and well-perfused.  No significant peripheral edema.  RESPIRATORY: Patient has nonlabored breathing without cough, wheeze, stridor.  PSYCHIATRIC: Patient is alert and oriented.  Mood and affect appear normal.  SKIN: Warm and dry.  No scalp, face, or neck lesions noted.    Physical Exam and Procedure    EARS: Normal shape and symmetry.  No tenderness when palpating the mastoid or tragal areas bilaterally.  The ears were then examined under the otic binocular microscope to perform cerumen removal.  Otoscopic exam on the right reveals moist ceruminous debris and otorrhea impacted down to the level of the tympanic membrane. The cerumen was removed with a #3, #5, and #7 suction.  The right tympanic membrane was round, intact and thickened.  There was no obvious evidence of effusion.  No sign of tympanic membrane perforation.  No retraction, granulation, drainage, or evidence of cholesteatoma.      Attention was turned to the left ear.  Otoscopic exam on the left reveals a minimal amount of cerumen.  The left tympanic membrane was round, intact without evidence of effusion.  No retraction, granulation, drainage, or evidence of cholesteatoma.      Assessment and Plan     ICD-10-CM    1. Infective otitis externa, right  H60.391 Remove Cerumen     ciprofloxacin-dexamethasone (CIPRODEX) 0.3-0.1 % otic suspension   2. Mixed conductive and sensorineural hearing loss of right ear, unspecified hearing status on contralateral side  H90.71  Remove Cerumen     ciprofloxacin-dexamethasone (CIPRODEX) 0.3-0.1 % otic suspension   3. History of placement of ear tubes  Z96.22 Remove Cerumen     ciprofloxacin-dexamethasone (CIPRODEX) 0.3-0.1 % otic suspension     It was my pleasure seeing Jamel Louis today in clinic.  The patient has a right ear otitis externa.  I debrided the canal under the microscope.  I will laced the patient on Ciprodex drops 5 drops to right ear twice daily for 10 days.  I will see him after completing drop treatment.  We will defer audiometric assessment today.  I discussed keeping the ear dry, and to not place anything in the ear except for the ear drops.     Jamel to follow up with Primary Care provider regarding elevated blood pressure.    Armand Valdez MD  Department of Otolarygology-Head and Neck Surgery  [facility]

## 2021-07-26 ENCOUNTER — OFFICE VISIT (OUTPATIENT)
Dept: AUDIOLOGY | Facility: CLINIC | Age: 51
End: 2021-07-26
Payer: COMMERCIAL

## 2021-07-26 ENCOUNTER — OFFICE VISIT (OUTPATIENT)
Dept: OTOLARYNGOLOGY | Facility: CLINIC | Age: 51
End: 2021-07-26
Payer: COMMERCIAL

## 2021-07-26 VITALS
BODY MASS INDEX: 60.25 KG/M2 | DIASTOLIC BLOOD PRESSURE: 93 MMHG | WEIGHT: 315 LBS | SYSTOLIC BLOOD PRESSURE: 151 MMHG | HEART RATE: 77 BPM | TEMPERATURE: 98.4 F

## 2021-07-26 DIAGNOSIS — H60.391 INFECTIVE OTITIS EXTERNA, RIGHT: Primary | ICD-10-CM

## 2021-07-26 DIAGNOSIS — H90.71 MIXED CONDUCTIVE AND SENSORINEURAL HEARING LOSS OF RIGHT EAR, UNSPECIFIED HEARING STATUS ON CONTRALATERAL SIDE: ICD-10-CM

## 2021-07-26 DIAGNOSIS — H92.11 DRAINAGE FROM RIGHT EAR: Primary | ICD-10-CM

## 2021-07-26 DIAGNOSIS — Z96.22 HISTORY OF PLACEMENT OF EAR TUBES: ICD-10-CM

## 2021-07-26 PROCEDURE — 99207 PR NO CHARGE LOS: CPT | Performed by: AUDIOLOGIST

## 2021-07-26 PROCEDURE — 99203 OFFICE O/P NEW LOW 30 MIN: CPT | Mod: 25 | Performed by: OTOLARYNGOLOGY

## 2021-07-26 PROCEDURE — 69210 REMOVE IMPACTED EAR WAX UNI: CPT | Performed by: OTOLARYNGOLOGY

## 2021-07-26 RX ORDER — CIPROFLOXACIN AND DEXAMETHASONE 3; 1 MG/ML; MG/ML
SUSPENSION/ DROPS AURICULAR (OTIC)
Qty: 10 ML | Refills: 3 | Status: SHIPPED | OUTPATIENT
Start: 2021-07-26

## 2021-07-26 ASSESSMENT — PAIN SCALES - GENERAL: PAINLEVEL: MODERATE PAIN (4)

## 2021-07-26 NOTE — PROGRESS NOTES
AUDIOLOGY REPORT    SUBJECTIVE:  Jamel Louis is a 50 year old male who was seen at [facility] for an audiologic evaluation, referred by Dr. Valdez.  No previous audiograms are available at today's appointment.  The patient reports right ear pain and drainage. The patient states he has a history of chronic ear infections with numerous sets of PE tubes.     OBJECTIVE:    Otoscopic exam indicates drainage  in the right ear .     The audiogram was deferred after medical evaluation by Dr. Valdez.       ASSESSMENT:   Right ear pain and drainage.         PLAN: It is recommended that the patient be seen by Dr. Valdez for medical evaluation of their ears and hearing evaluation.       Sharifa Dixon M.A. F-AAA  Clinical audiologist [place] # [id number]  7/26/2021

## 2021-07-26 NOTE — LETTER
7/26/2021         RE: Jamel Louis  [address] [address]        Dear Colleague,    Thank you for referring your patient, Jamel Louis, to the [facility]. Please see a copy of my visit note below.    Chief Complaint   Patient presents with     Ear Problem     right ear infection  with perforation on 6/18/21 put on oral antibiotics and ear drops with no change noted- pain in ear 4/10 and drainage     History of Present Illness  Jamel Louis is a 50 year old male who presents to today for ear evaluation.   I am seeing this patient in consultation for perforation of the tympanic membrane at the request of the provider Dr. Pedraza.  Patient reports decreased hearing, pain, pressure, drainage from the right ear.  He was seen on June 18 and placed on oral antibiotics and Floxin eardrops.  He completed treatment but was still having symptoms.  He does have some intermittent otalgia on the right.  He does have some itching in the ear.  He feels like the hearing is decreased more on the right-hand side.  No dizziness or vertigo.  He has had previous ear tube placement as an adult.  He denies any other previous ear surgery.  No identifiable recent water exposure or ear canal trauma.     Past Medical History  Patient Active Problem List   Diagnosis     Open abdominal wall wound, sequela     Perforation of colon (H)     Chronic gout without tophus, unspecified cause, unspecified site     Type 2 diabetes mellitus without complication, without long-term current use of insulin (H)     Tubular adenoma of colon     Morbid obesity (H)     Current Medications     Current Outpatient Medications:      allopurinol (ZYLOPRIM) 100 MG tablet, Take 1 tablet (100 mg) by mouth daily (Patient taking differently: Take 100 mg by mouth daily as needed ), Disp: 90 tablet, Rfl: 3     ciprofloxacin-dexamethasone (CIPRODEX) 0.3-0.1 % otic suspension, Place 5 drops in draining ear  twice daily for 10 days.  Call if drainage persistent past 10 days., Disp: 10 mL, Rfl: 3     indomethacin (INDOCIN) 50 MG capsule, Take 1 capsule (50 mg) by mouth 3 times daily as needed for moderate pain, Disp: 90 capsule, Rfl: 1    Allergies  No Known Allergies    Social History   Social History     Socioeconomic History     Marital status:      Spouse name: None     Number of children: None     Years of education: None     Highest education level: None   Occupational History     None   Tobacco Use     Smoking status: Never Smoker     Smokeless tobacco: Never Used   Substance and Sexual Activity     Alcohol use: Yes     Comment: occ     Drug use: Yes     Types: Marijuana     Sexual activity: Yes     Partners: Female   Other Topics Concern     None   Social History Narrative     None     Social Determinants of Health     Financial Resource Strain:      Difficulty of Paying Living Expenses:    Food Insecurity:      Worried About Running Out of Food in the Last Year:      Ran Out of Food in the Last Year:    Transportation Needs:      Lack of Transportation (Medical):      Lack of Transportation (Non-Medical):    Physical Activity:      Days of Exercise per Week:      Minutes of Exercise per Session:    Stress:      Feeling of Stress :    Social Connections:      Frequency of Communication with Friends and Family:      Frequency of Social Gatherings with Friends and Family:      Attends Hoahaoism Services:      Active Member of Clubs or Organizations:      Attends Club or Organization Meetings:      Marital Status:    Intimate Partner Violence:      Fear of Current or Ex-Partner:      Emotionally Abused:      Physically Abused:      Sexually Abused:        Family History  Family History   Problem Relation Age of Onset     Colon Cancer Father      Heart Disease Father      Chronic Obstructive Pulmonary Disease Father        Review of Systems  As per HPI and PMHx, otherwise 10+ comprehensive system review is  negative.    Physical Exam  BP (!) 151/93 (BP Location: Right arm, Patient Position: Chair, Cuff Size: Adult Large)   Pulse 77   Temp 98.4  F (36.9  C) (Tympanic)   Wt (!) 224.5 kg (495 lb)   BMI 60.25 kg/m    GENERAL: Patient is a pleasant, cooperative 50 year old male in no acute distress.  HEAD: Normocephalic, atraumatic.  Hair and scalp are normal.  EYES: Pupils are equal, round, reactive to light and accommodation.  Extraocular movements are intact.  The sclera nonicteric without injection.  The extraocular structures are normal.  EARS: See below.   NEUROLOGIC: Cranial nerves II through XII are grossly intact.  Voice is strong.  Facial nerve examination incomplete due to the patient wearing a mask.  CARDIOVASCULAR: Extremities are warm and well-perfused.  No significant peripheral edema.  RESPIRATORY: Patient has nonlabored breathing without cough, wheeze, stridor.  PSYCHIATRIC: Patient is alert and oriented.  Mood and affect appear normal.  SKIN: Warm and dry.  No scalp, face, or neck lesions noted.    Physical Exam and Procedure    EARS: Normal shape and symmetry.  No tenderness when palpating the mastoid or tragal areas bilaterally.  The ears were then examined under the otic binocular microscope to perform cerumen removal.  Otoscopic exam on the right reveals moist ceruminous debris and otorrhea impacted down to the level of the tympanic membrane. The cerumen was removed with a #3, #5, and #7 suction.  The right tympanic membrane was round, intact and thickened.  There was no obvious evidence of effusion.  No sign of tympanic membrane perforation.  No retraction, granulation, drainage, or evidence of cholesteatoma.      Attention was turned to the left ear.  Otoscopic exam on the left reveals a minimal amount of cerumen.  The left tympanic membrane was round, intact without evidence of effusion.  No retraction, granulation, drainage, or evidence of cholesteatoma.      Assessment and Plan     ICD-10-CM     1. Infective otitis externa, right  H60.391 Remove Cerumen     ciprofloxacin-dexamethasone (CIPRODEX) 0.3-0.1 % otic suspension   2. Mixed conductive and sensorineural hearing loss of right ear, unspecified hearing status on contralateral side  H90.71 Remove Cerumen     ciprofloxacin-dexamethasone (CIPRODEX) 0.3-0.1 % otic suspension   3. History of placement of ear tubes  Z96.22 Remove Cerumen     ciprofloxacin-dexamethasone (CIPRODEX) 0.3-0.1 % otic suspension     It was my pleasure seeing Jamel Louis today in clinic.  The patient has a right ear otitis externa.  I debrided the canal under the microscope.  I will laced the patient on Ciprodex drops 5 drops to right ear twice daily for 10 days.  I will see him after completing drop treatment.  We will defer audiometric assessment today.  I discussed keeping the ear dry, and to not place anything in the ear except for the ear drops.     Jamel to follow up with Primary Care provider regarding elevated blood pressure.    Armand Valdez MD  Department of Otolarygology-Head and Neck Surgery  [facility]       Again, thank you for allowing me to participate in the care of your patient.        Sincerely,        Armand Valdez MD

## 2021-07-26 NOTE — NURSING NOTE
"Initial BP (!) 146/98 (BP Location: Right arm, Patient Position: Chair, Cuff Size: Adult Regular)   Pulse 77   Temp 98.4  F (36.9  C) (Tympanic)   Wt (!) 224.5 kg (495 lb)   BMI 60.25 kg/m   Estimated body mass index is 60.25 kg/m  as calculated from the following:    Height as of 6/18/21: 1.93 m (6' 4\").    Weight as of this encounter: 224.5 kg (495 lb). .    Zahida Horowitz LPN    "

## 2021-08-03 NOTE — PROGRESS NOTES
Chief Complaint   Patient presents with     Ear Problem     follow up right otitis externa- still having pain     History of Present Illness  Jamel Louis is a 50 year old male who presents today for follow-up.  I evaluate the patient on 7/26/2021 with a right otitis externa.  His ear was debrided in office.  He was placed on Ciprodex drops and returns today for follow-up examination and possible debridement.    Since last seeing the patient, the patient reports significant improvement in his right ear.  He is still having some intermittent ringing in his right ear which is normally present.  He denies any otalgia, otorrhea, bloody otorrhea.  No dizziness or vertigo.  He feels like the hearing has improved some.  He has not had previous ear surgery.    The patient also notes a history of obstructive sleep apnea.  He does currently use a CPAP but is not really had his CPAP settings adjusted or evaluated in the better part of 15-20 years.  He does have issues with chronic daily headache and facial pressure.     Past Medical History  Patient Active Problem List   Diagnosis     Open abdominal wall wound, sequela     Perforation of colon (H)     Chronic gout without tophus, unspecified cause, unspecified site     Type 2 diabetes mellitus without complication, without long-term current use of insulin (H)     Tubular adenoma of colon     Morbid obesity (H)     Current Medications    Current Outpatient Medications:      allopurinol (ZYLOPRIM) 100 MG tablet, Take 1 tablet (100 mg) by mouth daily (Patient taking differently: Take 100 mg by mouth daily as needed ), Disp: 90 tablet, Rfl: 3     ciprofloxacin-dexamethasone (CIPRODEX) 0.3-0.1 % otic suspension, Place 5 drops in draining ear twice daily for 10 days.  Call if drainage persistent past 10 days., Disp: 10 mL, Rfl: 3     indomethacin (INDOCIN) 50 MG capsule, Take 1 capsule (50 mg) by mouth 3 times daily as needed for moderate pain, Disp: 90 capsule, Rfl:  1    Allergies  No Known Allergies    Social History  Social History     Socioeconomic History     Marital status:      Spouse name: Not on file     Number of children: Not on file     Years of education: Not on file     Highest education level: Not on file   Occupational History     Not on file   Tobacco Use     Smoking status: Never Smoker     Smokeless tobacco: Never Used   Substance and Sexual Activity     Alcohol use: Yes     Comment: occ     Drug use: Yes     Types: Marijuana     Sexual activity: Yes     Partners: Female   Other Topics Concern     Not on file   Social History Narrative     Not on file     Social Determinants of Health     Financial Resource Strain:      Difficulty of Paying Living Expenses:    Food Insecurity:      Worried About Running Out of Food in the Last Year:      Ran Out of Food in the Last Year:    Transportation Needs:      Lack of Transportation (Medical):      Lack of Transportation (Non-Medical):    Physical Activity:      Days of Exercise per Week:      Minutes of Exercise per Session:    Stress:      Feeling of Stress :    Social Connections:      Frequency of Communication with Friends and Family:      Frequency of Social Gatherings with Friends and Family:      Attends Congregation Services:      Active Member of Clubs or Organizations:      Attends Club or Organization Meetings:      Marital Status:    Intimate Partner Violence:      Fear of Current or Ex-Partner:      Emotionally Abused:      Physically Abused:      Sexually Abused:        Family History  Family History   Problem Relation Age of Onset     Colon Cancer Father      Heart Disease Father      Chronic Obstructive Pulmonary Disease Father        Review of Systems  As per HPI and PMHx, otherwise 10 system review including the head and neck, constitutional, eyes, respiratory, GI, skin, neurologic, lymphatic, endocrine, and allergy systems is negative.    Physical Exam  BP (!) 146/93 (BP Location: Right arm,  Patient Position: Sitting, Cuff Size: Adult Large)   Pulse 74   Temp 99.2  F (37.3  C) (Tympanic)   GENERAL: Patient is a pleasant, cooperative 50 year old male in no acute distress.  HEAD: Normocephalic, atraumatic.  Hair and scalp are normal.  EYES: Pupils are equal, round, reactive to light and accommodation.  Extraocular movements are intact.  The sclera nonicteric without injection.  The extraocular structures are normal.  EARS: See below.   NEUROLOGIC: Cranial nerves II through XII are grossly intact.  Voice is strong.  Facial nerve examination incomplete due to the patient wearing a mask.  CARDIOVASCULAR: Extremities are warm and well-perfused.  No significant peripheral edema.  RESPIRATORY: Patient has nonlabored breathing without cough, wheeze, stridor.  PSYCHIATRIC: Patient is alert and oriented.  Mood and affect appear normal.  SKIN: Warm and dry.  No scalp, face, or neck lesions noted.     Physical Exam and Procedure     EARS: Normal shape and symmetry.  No tenderness when palpating the mastoid or tragal areas bilaterally.  The ears were then examined under the otic binocular microscope to perform cerumen removal.  Otoscopic exam on the right reveals moist ceruminous debris down to the level of the tympanic membrane. The cerumen was removed with a #3, #5, and #7 suction.  The right tympanic membrane was round and intact.  It was no longer thickened.  There is no evidence of middle ear effusion.  No retraction, granulation, drainage, or evidence of cholesteatoma.       Attention was turned to the left ear.  Otoscopic exam on the left reveals a minimal amount of cerumen.  The left tympanic membrane was round, intact without evidence of effusion.  No retraction, granulation, drainage, or evidence of cholesteatoma.    Assessment and Plan     ICD-10-CM    1. Infective otitis externa, right  H60.391 Remove Cerumen     SLEEP EVALUATION & MANAGEMENT REFERRAL - ADULT -   2. Tinnitus, bilateral  H93.13 Remove  Cerumen     SLEEP EVALUATION & MANAGEMENT REFERRAL - ADULT -   3. Daily headache  R51.9 Remove Cerumen     SLEEP EVALUATION & MANAGEMENT REFERRAL - ADULT -   4. History of obstructive sleep apnea  Z86.69 Remove Cerumen     SLEEP EVALUATION & MANAGEMENT REFERRAL - ADULT -      It was my pleasure seeing Jamel Louis today in clinic.  The patient appears to have resolving right ear otitis externa.  I debrided the canal under the microscope.  I will have the patient stop his Ciprodex drops given how good his ear looks today.  I do think that he likely will require a hearing test for baseline.  The patient will return in 1 month for repeat ear exam and audiometric assessment.  I discussed keeping the ear dry, and to not place anything in the ear except for the ear drops.     Given the patient's history of obstructive sleep apnea with no recent follow-up and his issues with chronic daily headache and facial pressure, I think it would be reasonable to have him again see sleep medicine.  I placed a referral to sleep medicine at [place].    Jamel to follow up with Primary Care provider regarding elevated blood pressure.    Armand Valdez MD  Department of Otolarygology-Head and Neck Surgery  -[facility]

## 2021-08-05 ENCOUNTER — OFFICE VISIT (OUTPATIENT)
Dept: OTOLARYNGOLOGY | Facility: CLINIC | Age: 51
End: 2021-08-05
Payer: COMMERCIAL

## 2021-08-05 VITALS — TEMPERATURE: 99.2 F | HEART RATE: 74 BPM | DIASTOLIC BLOOD PRESSURE: 93 MMHG | SYSTOLIC BLOOD PRESSURE: 146 MMHG

## 2021-08-05 DIAGNOSIS — R51.9 DAILY HEADACHE: ICD-10-CM

## 2021-08-05 DIAGNOSIS — H60.391 INFECTIVE OTITIS EXTERNA, RIGHT: Primary | ICD-10-CM

## 2021-08-05 DIAGNOSIS — Z86.69 HISTORY OF OBSTRUCTIVE SLEEP APNEA: ICD-10-CM

## 2021-08-05 DIAGNOSIS — H93.13 TINNITUS, BILATERAL: ICD-10-CM

## 2021-08-05 PROCEDURE — 99213 OFFICE O/P EST LOW 20 MIN: CPT | Mod: 25 | Performed by: OTOLARYNGOLOGY

## 2021-08-05 PROCEDURE — 69210 REMOVE IMPACTED EAR WAX UNI: CPT | Performed by: OTOLARYNGOLOGY

## 2021-08-05 NOTE — LETTER
8/5/2021         RE: Jamel Louis  [address] [facility] [address]        Dear Colleague,    Thank you for referring your patient, Jamel Louis, to the [facility]. Please see a copy of my visit note below.    Chief Complaint   Patient presents with     Ear Problem     follow up right otitis externa- still having pain     History of Present Illness  Jamel Louis is a 50 year old male who presents today for follow-up.  I evaluate the patient on 7/26/2021 with a right otitis externa.  His ear was debrided in office.  He was placed on Ciprodex drops and returns today for follow-up examination and possible debridement.    Since last seeing the patient, the patient reports significant improvement in his right ear.  He is still having some intermittent ringing in his right ear which is normally present.  He denies any otalgia, otorrhea, bloody otorrhea.  No dizziness or vertigo.  He feels like the hearing has improved some.  He has not had previous ear surgery.    The patient also notes a history of obstructive sleep apnea.  He does currently use a CPAP but is not really had his CPAP settings adjusted or evaluated in the better part of 15-20 years.  He does have issues with chronic daily headache and facial pressure.     Past Medical History  Patient Active Problem List   Diagnosis     Open abdominal wall wound, sequela     Perforation of colon (H)     Chronic gout without tophus, unspecified cause, unspecified site     Type 2 diabetes mellitus without complication, without long-term current use of insulin (H)     Tubular adenoma of colon     Morbid obesity (H)     Current Medications    Current Outpatient Medications:      allopurinol (ZYLOPRIM) 100 MG tablet, Take 1 tablet (100 mg) by mouth daily (Patient taking differently: Take 100 mg by mouth daily as needed ), Disp: 90 tablet, Rfl: 3     ciprofloxacin-dexamethasone (CIPRODEX) 0.3-0.1 % otic suspension, Place 5  drops in draining ear twice daily for 10 days.  Call if drainage persistent past 10 days., Disp: 10 mL, Rfl: 3     indomethacin (INDOCIN) 50 MG capsule, Take 1 capsule (50 mg) by mouth 3 times daily as needed for moderate pain, Disp: 90 capsule, Rfl: 1    Allergies  No Known Allergies    Social History  Social History     Socioeconomic History     Marital status:      Spouse name: Not on file     Number of children: Not on file     Years of education: Not on file     Highest education level: Not on file   Occupational History     Not on file   Tobacco Use     Smoking status: Never Smoker     Smokeless tobacco: Never Used   Substance and Sexual Activity     Alcohol use: Yes     Comment: occ     Drug use: Yes     Types: Marijuana     Sexual activity: Yes     Partners: Female   Other Topics Concern     Not on file   Social History Narrative     Not on file     Social Determinants of Health     Financial Resource Strain:      Difficulty of Paying Living Expenses:    Food Insecurity:      Worried About Running Out of Food in the Last Year:      Ran Out of Food in the Last Year:    Transportation Needs:      Lack of Transportation (Medical):      Lack of Transportation (Non-Medical):    Physical Activity:      Days of Exercise per Week:      Minutes of Exercise per Session:    Stress:      Feeling of Stress :    Social Connections:      Frequency of Communication with Friends and Family:      Frequency of Social Gatherings with Friends and Family:      Attends Druze Services:      Active Member of Clubs or Organizations:      Attends Club or Organization Meetings:      Marital Status:    Intimate Partner Violence:      Fear of Current or Ex-Partner:      Emotionally Abused:      Physically Abused:      Sexually Abused:        Family History  Family History   Problem Relation Age of Onset     Colon Cancer Father      Heart Disease Father      Chronic Obstructive Pulmonary Disease Father        Review of  Systems  As per HPI and PMHx, otherwise 10 system review including the head and neck, constitutional, eyes, respiratory, GI, skin, neurologic, lymphatic, endocrine, and allergy systems is negative.    Physical Exam  BP (!) 146/93 (BP Location: Right arm, Patient Position: Sitting, Cuff Size: Adult Large)   Pulse 74   Temp 99.2  F (37.3  C) (Tympanic)   GENERAL: Patient is a pleasant, cooperative 50 year old male in no acute distress.  HEAD: Normocephalic, atraumatic.  Hair and scalp are normal.  EYES: Pupils are equal, round, reactive to light and accommodation.  Extraocular movements are intact.  The sclera nonicteric without injection.  The extraocular structures are normal.  EARS: See below.   NEUROLOGIC: Cranial nerves II through XII are grossly intact.  Voice is strong.  Facial nerve examination incomplete due to the patient wearing a mask.  CARDIOVASCULAR: Extremities are warm and well-perfused.  No significant peripheral edema.  RESPIRATORY: Patient has nonlabored breathing without cough, wheeze, stridor.  PSYCHIATRIC: Patient is alert and oriented.  Mood and affect appear normal.  SKIN: Warm and dry.  No scalp, face, or neck lesions noted.     Physical Exam and Procedure     EARS: Normal shape and symmetry.  No tenderness when palpating the mastoid or tragal areas bilaterally.  The ears were then examined under the otic binocular microscope to perform cerumen removal.  Otoscopic exam on the right reveals moist ceruminous debris down to the level of the tympanic membrane. The cerumen was removed with a #3, #5, and #7 suction.  The right tympanic membrane was round and intact.  It was no longer thickened.  There is no evidence of middle ear effusion.  No retraction, granulation, drainage, or evidence of cholesteatoma.       Attention was turned to the left ear.  Otoscopic exam on the left reveals a minimal amount of cerumen.  The left tympanic membrane was round, intact without evidence of effusion.  No  retraction, granulation, drainage, or evidence of cholesteatoma.    Assessment and Plan     ICD-10-CM    1. Infective otitis externa, right  H60.391 Remove Cerumen     SLEEP EVALUATION & MANAGEMENT REFERRAL - ADULT -   2. Tinnitus, bilateral  H93.13 Remove Cerumen     SLEEP EVALUATION & MANAGEMENT REFERRAL - ADULT -   3. Daily headache  R51.9 Remove Cerumen     SLEEP EVALUATION & MANAGEMENT REFERRAL - ADULT -   4. History of obstructive sleep apnea  Z86.69 Remove Cerumen     SLEEP EVALUATION & MANAGEMENT REFERRAL - ADULT -      It was my pleasure seeing Jamel Louis today in clinic.  The patient appears to have resolving right ear otitis externa.  I debrided the canal under the microscope.  I will have the patient stop his Ciprodex drops given how good his ear looks today.  I do think that he likely will require a hearing test for baseline.  The patient will return in 1 month for repeat ear exam and audiometric assessment.  I discussed keeping the ear dry, and to not place anything in the ear except for the ear drops.     Given the patient's history of obstructive sleep apnea with no recent follow-up and his issues with chronic daily headache and facial pressure, I think it would be reasonable to have him again see sleep medicine.  I placed a referral to sleep medicine at [place].    Jamel to follow up with Primary Care provider regarding elevated blood pressure.    Armand Valdez MD  Department of Otolarygology-Head and Neck Surgery  [facility]         Again, thank you for allowing me to participate in the care of your patient.        Sincerely,        Armand Valdez MD

## 2021-08-05 NOTE — NURSING NOTE
"Initial BP (!) 146/93 (BP Location: Right arm, Patient Position: Sitting, Cuff Size: Adult Large)   Pulse 74   Temp 99.2  F (37.3  C) (Tympanic)  Estimated body mass index is 60.25 kg/m  as calculated from the following:    Height as of 6/18/21: 1.93 m (6' 4\").    Weight as of 7/26/21: 224.5 kg (495 lb). .    Clara Galeano CMA  '  "

## 2021-08-05 NOTE — PATIENT INSTRUCTIONS
Per physician instructions      If you have questions or concerns on any instructions given to you by your provider today or if you need to schedule an appointment, you can reach us at [phone].

## 2021-09-10 NOTE — PROGRESS NOTES
Chief Complaint   Patient presents with     RECHECK     Right ear      History of Present Illness  Jamel Louis is a 50 year old male who presents today for follow-up.  I initially evaluated the patient on 7/26/2021 with a right otitis externa.  His ear was debrided in office.  He was placed on Ciprodex drops and seen for follow-up on 8/5/2021.  He had resolution of his otitis externa.  He returns today for repeat examination and audiometric assessment.      Since last seeing the patient, the patient reports some mild serous drainage from the right ear.  He is still having some intermittent ringing in his right ear which is normally present.  He denies any otalgia, otorrhea, bloody otorrhea.  No dizziness or vertigo.  He feels like the hearing has improved some.  He has not had previous ear surgery.    Past Medical History  Patient Active Problem List   Diagnosis     Open abdominal wall wound, sequela     Perforation of colon (H)     Chronic gout without tophus, unspecified cause, unspecified site     Type 2 diabetes mellitus without complication, without long-term current use of insulin (H)     Tubular adenoma of colon     Morbid obesity (H)     Current Medications    Current Outpatient Medications:      allopurinol (ZYLOPRIM) 100 MG tablet, Take 1 tablet (100 mg) by mouth daily (Patient not taking: Reported on 9/13/2021), Disp: 90 tablet, Rfl: 3     ciprofloxacin-dexamethasone (CIPRODEX) 0.3-0.1 % otic suspension, Place 5 drops in draining ear twice daily for 10 days.  Call if drainage persistent past 10 days. (Patient not taking: Reported on 9/13/2021), Disp: 10 mL, Rfl: 3     indomethacin (INDOCIN) 50 MG capsule, Take 1 capsule (50 mg) by mouth 3 times daily as needed for moderate pain (Patient not taking: Reported on 9/13/2021), Disp: 90 capsule, Rfl: 1    Allergies  No Known Allergies    Social History  Social History     Socioeconomic History     Marital status:      Spouse name: Not on file      Number of children: Not on file     Years of education: Not on file     Highest education level: Not on file   Occupational History     Not on file   Tobacco Use     Smoking status: Never Smoker     Smokeless tobacco: Never Used   Substance and Sexual Activity     Alcohol use: Yes     Comment: occ     Drug use: Yes     Types: Marijuana     Sexual activity: Yes     Partners: Female   Other Topics Concern     Not on file   Social History Narrative     Not on file     Social Determinants of Health     Financial Resource Strain:      Difficulty of Paying Living Expenses:    Food Insecurity:      Worried About Running Out of Food in the Last Year:      Ran Out of Food in the Last Year:    Transportation Needs:      Lack of Transportation (Medical):      Lack of Transportation (Non-Medical):    Physical Activity:      Days of Exercise per Week:      Minutes of Exercise per Session:    Stress:      Feeling of Stress :    Social Connections:      Frequency of Communication with Friends and Family:      Frequency of Social Gatherings with Friends and Family:      Attends Yazidi Services:      Active Member of Clubs or Organizations:      Attends Club or Organization Meetings:      Marital Status:    Intimate Partner Violence:      Fear of Current or Ex-Partner:      Emotionally Abused:      Physically Abused:      Sexually Abused:        Family History  Family History   Problem Relation Age of Onset     Colon Cancer Father      Heart Disease Father      Chronic Obstructive Pulmonary Disease Father        Review of Systems  As per HPI and PMHx, otherwise 10 system review including the head and neck, constitutional, eyes, respiratory, GI, skin, neurologic, lymphatic, endocrine, and allergy systems is negative.    Physical Exam  BP (!) 150/83 (BP Location: Right arm, Patient Position: Chair, Cuff Size: Adult Large)   Pulse 75   Temp 98.8  F (37.1  C) (Tympanic)   GENERAL: Patient is a pleasant, cooperative 50 year old  male in no acute distress.  HEAD: Normocephalic, atraumatic.  Hair and scalp are normal.  EYES: Pupils are equal, round, reactive to light and accommodation.  Extraocular movements are intact.  The sclera nonicteric without injection.  The extraocular structures are normal.  EARS: See below.  NEUROLOGIC: Cranial nerves II through XII are grossly intact.  Voice is strong.  Patient is House-Brackmann I/VI bilaterally.  CARDIOVASCULAR: Extremities are warm and well-perfused.  No significant peripheral edema.  RESPIRATORY: Patient has nonlabored breathing without cough, wheeze, stridor.  PSYCHIATRIC: Patient is alert and oriented.  Mood and affect appear normal.  SKIN: Warm and dry.  No scalp, face, or neck lesions noted.    Physical Exam and Procedure    EARS: Normal shape and symmetry.  No tenderness when palpating the mastoid or tragal areas bilaterally.  The ears were then examined under the otic binocular microscope to perform cerumen removal.  Otoscopic exam on the right reveals impacted moist ceruminous debris cerumen down to the level of the tympanic membrane.  The cerumen was removed with 3, #5, and #7 suction.  The right tympanic membrane was round, intact without evidence of effusion.  No retraction, granulation, drainage, or evidence of cholesteatoma.      Attention was turned to the left ear.  Otoscopic exam on the left reveals impacted cerumen overlying of the tympanic membrane.  The cerumen was removed with angled pick and #5 suction.  The left tympanic membrane was round, intact without evidence of effusion.  No retraction, granulation, drainage, or evidence of cholesteatoma.        Assessment and Plan     ICD-10-CM    1. Infective otitis externa, right  H60.391 Remove Cerumen     Respiratory Aerobic Bacterial Culture with Gram Stain     Gram stain     Yeast culture   2. Tinnitus, bilateral  H93.13 Remove Cerumen     Respiratory Aerobic Bacterial Culture with Gram Stain     Gram stain     Yeast culture    3. History of placement of ear tubes  Z96.22 Remove Cerumen     Respiratory Aerobic Bacterial Culture with Gram Stain     Gram stain     Yeast culture      It was my pleasure seeing Jamel Louis today in clinic.  The patient presents today with continued signs of otitis externa.  This might be fungal in origin given his previous bacterial infection and need for antibiotic eardrop use.  His ear was successfully debrided today in office and we placed some Lotrimin powder in his ear.  I took a culture of the drainage.  I will contact you with the culture results.  Depending on what on the culture, we might see him back for periodic debridement and ear powder.  We could also get him powder for home depending on how his ears are looking.  We will defer audiometric testing at this time.  I will contact him with the culture results when available.    Jamel to follow up with Primary Care provider regarding elevated blood pressure.    Armand Valdez MD  Department of Otolaryngology-Head and Neck Surgery  [facility]

## 2021-09-13 ENCOUNTER — OFFICE VISIT (OUTPATIENT)
Dept: OTOLARYNGOLOGY | Facility: CLINIC | Age: 51
End: 2021-09-13
Payer: COMMERCIAL

## 2021-09-13 ENCOUNTER — OFFICE VISIT (OUTPATIENT)
Dept: AUDIOLOGY | Facility: CLINIC | Age: 51
End: 2021-09-13
Payer: COMMERCIAL

## 2021-09-13 VITALS — HEART RATE: 75 BPM | DIASTOLIC BLOOD PRESSURE: 83 MMHG | TEMPERATURE: 98.8 F | SYSTOLIC BLOOD PRESSURE: 150 MMHG

## 2021-09-13 DIAGNOSIS — H93.13 TINNITUS, BILATERAL: ICD-10-CM

## 2021-09-13 DIAGNOSIS — Z96.22 HISTORY OF PLACEMENT OF EAR TUBES: ICD-10-CM

## 2021-09-13 DIAGNOSIS — H92.11 EAR DRAINAGE RIGHT: Primary | ICD-10-CM

## 2021-09-13 DIAGNOSIS — H60.391 INFECTIVE OTITIS EXTERNA, RIGHT: Primary | ICD-10-CM

## 2021-09-13 PROCEDURE — 99214 OFFICE O/P EST MOD 30 MIN: CPT | Mod: 25 | Performed by: OTOLARYNGOLOGY

## 2021-09-13 PROCEDURE — 87070 CULTURE OTHR SPECIMN AEROBIC: CPT | Performed by: OTOLARYNGOLOGY

## 2021-09-13 PROCEDURE — 69210 REMOVE IMPACTED EAR WAX UNI: CPT | Performed by: OTOLARYNGOLOGY

## 2021-09-13 PROCEDURE — 87102 FUNGUS ISOLATION CULTURE: CPT | Performed by: OTOLARYNGOLOGY

## 2021-09-13 PROCEDURE — 99207 PR NO CHARGE LOS: CPT | Performed by: AUDIOLOGIST

## 2021-09-13 PROCEDURE — 87205 SMEAR GRAM STAIN: CPT | Performed by: OTOLARYNGOLOGY

## 2021-09-13 NOTE — PROGRESS NOTES
AUDIOLOGY REPORT    SUBJECTIVE:  Jamel Louis is a 50 year old male who was seen at [facility] for an audiologic evaluation, referred by Dr. Valdez.  No previous audiograms are available at today's appointment.  The patient reports he has a long history of chronic otitis media and otitis externa. He feels his ears are better but he is still having right ear pain, drainage and fullness. He reports he does not hear well. The patient denies family history of hearing loss.     OBJECTIVE:    Otoscopic exam indicates drainage and debris in the right ear. The left ear canal is clear.      After medical evaluation by Dr. Valdez the hearing evaluation was deferred.    ASSESSMENT:   Right ear drainage.      PLAN: It is recommended that the patient be seen by Dr. Valdez for medical evaluation of their ears and hearing evaluation.        JOSIANE Gorman-AAA  Clinical audiologist [place] # [id number]  9/13/2021

## 2021-09-13 NOTE — LETTER
9/13/2021         RE: Jamel Louis  [address] [facility] [address]        Dear Colleague,    Thank you for referring your patient, Jamel Louis, to the [facility]. Please see a copy of my visit note below.    Chief Complaint   Patient presents with     RECHECK     Right ear      History of Present Illness  Jamel Louis is a 50 year old male who presents today for follow-up.  I initially evaluated the patient on 7/26/2021 with a right otitis externa.  His ear was debrided in office.  He was placed on Ciprodex drops and seen for follow-up on 8/5/2021.  He had resolution of his otitis externa.  He returns today for repeat examination and audiometric assessment.      Since last seeing the patient, the patient reports some mild serous drainage from the right ear.  He is still having some intermittent ringing in his right ear which is normally present.  He denies any otalgia, otorrhea, bloody otorrhea.  No dizziness or vertigo.  He feels like the hearing has improved some.  He has not had previous ear surgery.    Past Medical History  Patient Active Problem List   Diagnosis     Open abdominal wall wound, sequela     Perforation of colon (H)     Chronic gout without tophus, unspecified cause, unspecified site     Type 2 diabetes mellitus without complication, without long-term current use of insulin (H)     Tubular adenoma of colon     Morbid obesity (H)     Current Medications    Current Outpatient Medications:      allopurinol (ZYLOPRIM) 100 MG tablet, Take 1 tablet (100 mg) by mouth daily (Patient not taking: Reported on 9/13/2021), Disp: 90 tablet, Rfl: 3     ciprofloxacin-dexamethasone (CIPRODEX) 0.3-0.1 % otic suspension, Place 5 drops in draining ear twice daily for 10 days.  Call if drainage persistent past 10 days. (Patient not taking: Reported on 9/13/2021), Disp: 10 mL, Rfl: 3     indomethacin (INDOCIN) 50 MG capsule, Take 1 capsule (50 mg) by mouth 3 times  daily as needed for moderate pain (Patient not taking: Reported on 9/13/2021), Disp: 90 capsule, Rfl: 1    Allergies  No Known Allergies    Social History  Social History     Socioeconomic History     Marital status:      Spouse name: Not on file     Number of children: Not on file     Years of education: Not on file     Highest education level: Not on file   Occupational History     Not on file   Tobacco Use     Smoking status: Never Smoker     Smokeless tobacco: Never Used   Substance and Sexual Activity     Alcohol use: Yes     Comment: occ     Drug use: Yes     Types: Marijuana     Sexual activity: Yes     Partners: Female   Other Topics Concern     Not on file   Social History Narrative     Not on file     Social Determinants of Health     Financial Resource Strain:      Difficulty of Paying Living Expenses:    Food Insecurity:      Worried About Running Out of Food in the Last Year:      Ran Out of Food in the Last Year:    Transportation Needs:      Lack of Transportation (Medical):      Lack of Transportation (Non-Medical):    Physical Activity:      Days of Exercise per Week:      Minutes of Exercise per Session:    Stress:      Feeling of Stress :    Social Connections:      Frequency of Communication with Friends and Family:      Frequency of Social Gatherings with Friends and Family:      Attends Anabaptism Services:      Active Member of Clubs or Organizations:      Attends Club or Organization Meetings:      Marital Status:    Intimate Partner Violence:      Fear of Current or Ex-Partner:      Emotionally Abused:      Physically Abused:      Sexually Abused:        Family History  Family History   Problem Relation Age of Onset     Colon Cancer Father      Heart Disease Father      Chronic Obstructive Pulmonary Disease Father        Review of Systems  As per HPI and PMHx, otherwise 10 system review including the head and neck, constitutional, eyes, respiratory, GI, skin, neurologic, lymphatic,  endocrine, and allergy systems is negative.    Physical Exam  BP (!) 150/83 (BP Location: Right arm, Patient Position: Chair, Cuff Size: Adult Large)   Pulse 75   Temp 98.8  F (37.1  C) (Tympanic)   GENERAL: Patient is a pleasant, cooperative 50 year old male in no acute distress.  HEAD: Normocephalic, atraumatic.  Hair and scalp are normal.  EYES: Pupils are equal, round, reactive to light and accommodation.  Extraocular movements are intact.  The sclera nonicteric without injection.  The extraocular structures are normal.  EARS: See below.  NEUROLOGIC: Cranial nerves II through XII are grossly intact.  Voice is strong.  Patient is House-Brackmann I/VI bilaterally.  CARDIOVASCULAR: Extremities are warm and well-perfused.  No significant peripheral edema.  RESPIRATORY: Patient has nonlabored breathing without cough, wheeze, stridor.  PSYCHIATRIC: Patient is alert and oriented.  Mood and affect appear normal.  SKIN: Warm and dry.  No scalp, face, or neck lesions noted.    Physical Exam and Procedure    EARS: Normal shape and symmetry.  No tenderness when palpating the mastoid or tragal areas bilaterally.  The ears were then examined under the otic binocular microscope to perform cerumen removal.  Otoscopic exam on the right reveals impacted moist ceruminous debris cerumen down to the level of the tympanic membrane.  The cerumen was removed with 3, #5, and #7 suction.  The right tympanic membrane was round, intact without evidence of effusion.  No retraction, granulation, drainage, or evidence of cholesteatoma.      Attention was turned to the left ear.  Otoscopic exam on the left reveals impacted cerumen overlying of the tympanic membrane.  The cerumen was removed with angled pick and #5 suction.  The left tympanic membrane was round, intact without evidence of effusion.  No retraction, granulation, drainage, or evidence of cholesteatoma.        Assessment and Plan     ICD-10-CM    1. Infective otitis externa,  right  H60.391 Remove Cerumen     Respiratory Aerobic Bacterial Culture with Gram Stain     Gram stain     Yeast culture   2. Tinnitus, bilateral  H93.13 Remove Cerumen     Respiratory Aerobic Bacterial Culture with Gram Stain     Gram stain     Yeast culture   3. History of placement of ear tubes  Z96.22 Remove Cerumen     Respiratory Aerobic Bacterial Culture with Gram Stain     Gram stain     Yeast culture      It was my pleasure seeing Jamel Louis today in clinic.  The patient presents today with continued signs of otitis externa.  This might be fungal in origin given his previous bacterial infection and need for antibiotic eardrop use.  His ear was successfully debrided today in office and we placed some Lotrimin powder in his ear.  I took a culture of the drainage.  I will contact you with the culture results.  Depending on what on the culture, we might see him back for periodic debridement and ear powder.  We could also get him powder for home depending on how his ears are looking.  We will defer audiometric testing at this time.  I will contact him with the culture results when available.    Jamel to follow up with Primary Care provider regarding elevated blood pressure.    Armand Valdez MD  Department of Otolaryngology-Head and Neck Surgery  [facility]             Again, thank you for allowing me to participate in the care of your patient.        Sincerely,        Armand Valdez MD

## 2021-09-13 NOTE — NURSING NOTE
"Chief Complaint   Patient presents with     RECHECK     Right ear        Initial BP (!) 150/83 (BP Location: Right arm, Patient Position: Chair, Cuff Size: Adult Large)   Pulse 75   Temp 98.8  F (37.1  C) (Tympanic)  Estimated body mass index is 60.25 kg/m  as calculated from the following:    Height as of 6/18/21: 1.93 m (6' 4\").    Weight as of 7/26/21: 224.5 kg (495 lb).  BP completed using cuff size: regular   Medications and allergies reviewed.      Shnaa ROMERO CMA     "

## 2021-09-13 NOTE — PATIENT INSTRUCTIONS
Per physician instructions.    If you have questions or concerns on any instructions given to you by your provider today or if you need to schedule an appointment, you can reach us at [phone].  Listen to the menu for the Specialty Clinic option.      Thank you!

## 2021-09-15 LAB
BACTERIA SPEC CULT: ABNORMAL
BACTERIA SPEC CULT: ABNORMAL
GRAM STAIN RESULT: ABNORMAL
GRAM STAIN RESULT: ABNORMAL

## 2021-09-16 ENCOUNTER — TELEPHONE (OUTPATIENT)
Dept: OTOLARYNGOLOGY | Facility: CLINIC | Age: 51
End: 2021-09-16

## 2021-09-16 NOTE — TELEPHONE ENCOUNTER
I spoke with the patient and his wife on the phone.  His ear is growing Aspergillus fumigatus.  He is feeling better after debridement and treatment with the powder.  I will see him back next week on Thursday for repeat examination and possible debridement.    Armand Valdez MD  Department of Otolaryngology-Head and Neck Surgery  [facility]

## 2021-09-21 NOTE — PROGRESS NOTES
Chief Complaint   Patient presents with     Ear Problem     Follow up ear infection (right ear) doing better     History of Present Illness  Jamel Louis is a 50 year old male who presents today for follow-up.  I initially evaluated the patient on 7/26/2021 with a right otitis externa.  His ear was debrided in office.  He was placed on Ciprodex drops and seen for follow-up on 8/5/2021.  He had resolution of his otitis externa.    I evaluated the patient on 9/13/2021 for follow-up and audiometric assessment.  His audiogram was deferred as he had impacted ceruminous debris and otorrhea.  His ear was debrided and I took a culture.  His culture grew normal ashu and Aspergillus fumigatus.  I had powdered his ear with antifungal powder when I saw him in the office.  I figured I would see him again for examination and debridement to see how much debris he was retaining and to decide if further treatment was needed.  He returns today for repeat examination.      Since last seeing the patient, the patient reports  that his ear felt great right after we cleaned him.  Slowly over the week, he has had more plugging and the tinnitus is returned.  He denies any otalgia or chon otorrhea.  No bloody otorrhea. No dizziness or vertigo.  He feels like the hearing has improved some.  He has not had previous ear surgery.    Past Medical History  Patient Active Problem List   Diagnosis     Open abdominal wall wound, sequela     Perforation of colon (H)     Chronic gout without tophus, unspecified cause, unspecified site     Type 2 diabetes mellitus without complication, without long-term current use of insulin (H)     Tubular adenoma of colon     Morbid obesity (H)     Current Medications    Current Outpatient Medications:      clotrimazole (LOTRIMIN) 1 % external solution, 5 drops right ear twice daily for 10 days, Disp: 10 mL, Rfl: 1     ciprofloxacin-dexamethasone (CIPRODEX) 0.3-0.1 % otic suspension, Place 5 drops in  draining ear twice daily for 10 days.  Call if drainage persistent past 10 days. (Patient not taking: Reported on 9/13/2021), Disp: 10 mL, Rfl: 3     indomethacin (INDOCIN) 50 MG capsule, Take 1 capsule (50 mg) by mouth 3 times daily as needed for moderate pain (Patient not taking: Reported on 9/13/2021), Disp: 90 capsule, Rfl: 1    Allergies  No Known Allergies    Social History  Social History     Socioeconomic History     Marital status:      Spouse name: Not on file     Number of children: Not on file     Years of education: Not on file     Highest education level: Not on file   Occupational History     Not on file   Tobacco Use     Smoking status: Never Smoker     Smokeless tobacco: Never Used   Substance and Sexual Activity     Alcohol use: Yes     Comment: occ     Drug use: Yes     Types: Marijuana     Sexual activity: Yes     Partners: Female   Other Topics Concern     Not on file   Social History Narrative     Not on file     Social Determinants of Health     Financial Resource Strain:      Difficulty of Paying Living Expenses:    Food Insecurity:      Worried About Running Out of Food in the Last Year:      Ran Out of Food in the Last Year:    Transportation Needs:      Lack of Transportation (Medical):      Lack of Transportation (Non-Medical):    Physical Activity:      Days of Exercise per Week:      Minutes of Exercise per Session:    Stress:      Feeling of Stress :    Social Connections:      Frequency of Communication with Friends and Family:      Frequency of Social Gatherings with Friends and Family:      Attends [employer] Services:      Active Member of Clubs or Organizations:      Attends Club or Organization Meetings:      Marital Status:    Intimate Partner Violence:      Fear of Current or Ex-Partner:      Emotionally Abused:      Physically Abused:      Sexually Abused:        Family History  Family History   Problem Relation Age of Onset     Colon Cancer Father      Heart Disease  "Father      Chronic Obstructive Pulmonary Disease Father        Review of Systems  As per HPI and PMHx, otherwise 10 system review including the head and neck, constitutional, eyes, respiratory, GI, skin, neurologic, lymphatic, endocrine, and allergy systems is negative.    Physical Exam  BP (!) 175/78 (BP Location: Right arm, Patient Position: Sitting, Cuff Size: Adult Large)   Pulse 100   Temp 98.7  F (37.1  C) (Tympanic)   Ht 1.93 m (6' 4\")   Wt (!) 224.5 kg (495 lb)   BMI 60.25 kg/m    GENERAL: Patient is a pleasant, cooperative 50 year old male in no acute distress.  HEAD: Normocephalic, atraumatic.  Hair and scalp are normal.  EYES: Pupils are equal, round, reactive to light and accommodation.  Extraocular movements are intact.  The sclera nonicteric without injection.  The extraocular structures are normal.  EARS: See below.  NEUROLOGIC: Cranial nerves II through XII are grossly intact.  Voice is strong.  Facial nerve examination incomplete due to the patient wearing a mask.  CARDIOVASCULAR: Extremities are warm and well-perfused.  No significant peripheral edema.  RESPIRATORY: Patient has nonlabored breathing without cough, wheeze, stridor.  PSYCHIATRIC: Patient is alert and oriented.  Mood and affect appear normal.  SKIN: Warm and dry.  No scalp, face, or neck lesions noted.    Physical Exam and Procedure    EARS: Normal shape and symmetry.  No tenderness when palpating the mastoid or tragal areas bilaterally.  The ears were then examined under the otic binocular microscope to perform cerumen removal.  Otoscopic exam on the right reveals impacted moist ceruminous debris cerumen down to the level of the tympanic membrane.  The cerumen was removed with 3, #5, and #7 suction.  The right tympanic membrane was round, intact without evidence of effusion.  No retraction, granulation, drainage, or evidence of cholesteatoma.    The ear was powdered with clotrimazole.    Attention was turned to the left ear.  " Otoscopic exam on the left reveals impacted cerumen overlying of the tympanic membrane.  The cerumen was removed with angled pick and #5 suction.  The left tympanic membrane was round, intact without evidence of effusion.  No retraction, granulation, drainage, or evidence of cholesteatoma.      Assessment and Plan     ICD-10-CM    1. Otitis externa, fungal, right ear  B36.9 Remove Cerumen    H62.41 clotrimazole (LOTRIMIN) 1 % external solution   2. Tinnitus, bilateral  H93.13 Remove Cerumen     clotrimazole (LOTRIMIN) 1 % external solution      It was my pleasure seeing Jamel [place] Louis today in clinic.  The patient continues to have signs of fungal otitis externa confirmed with culture.  I do think he will require an antifungal eardrop given the amount of debris that he retained over the past week.  We will start him on Lotrimin eardrops 5 drops twice daily for 10 days.  I like to see him back after drop treatment.  If this does not work, we could either get him powder for home or to think about using gentian violet.      Jamel to follow up with Primary Care provider regarding elevated blood pressure.    Armand Valdez MD  Department of Otolaryngology-Head and Neck Surgery  [facility]

## 2021-09-23 ENCOUNTER — OFFICE VISIT (OUTPATIENT)
Dept: OTOLARYNGOLOGY | Facility: CLINIC | Age: 51
End: 2021-09-23
Payer: COMMERCIAL

## 2021-09-23 VITALS
HEART RATE: 100 BPM | WEIGHT: 315 LBS | HEIGHT: 76 IN | SYSTOLIC BLOOD PRESSURE: 175 MMHG | DIASTOLIC BLOOD PRESSURE: 78 MMHG | BODY MASS INDEX: 38.36 KG/M2 | TEMPERATURE: 98.7 F

## 2021-09-23 DIAGNOSIS — B36.9 OTITIS EXTERNA, FUNGAL, RIGHT EAR: Primary | ICD-10-CM

## 2021-09-23 DIAGNOSIS — H62.41 OTITIS EXTERNA, FUNGAL, RIGHT EAR: Primary | ICD-10-CM

## 2021-09-23 DIAGNOSIS — H93.13 TINNITUS, BILATERAL: ICD-10-CM

## 2021-09-23 PROCEDURE — 99213 OFFICE O/P EST LOW 20 MIN: CPT | Mod: 25 | Performed by: OTOLARYNGOLOGY

## 2021-09-23 PROCEDURE — 69210 REMOVE IMPACTED EAR WAX UNI: CPT | Mod: 50 | Performed by: OTOLARYNGOLOGY

## 2021-09-23 RX ORDER — CLOTRIMAZOLE 1 G/ML
SOLUTION TOPICAL
Qty: 10 ML | Refills: 1 | Status: SHIPPED | OUTPATIENT
Start: 2021-09-23

## 2021-09-23 ASSESSMENT — MIFFLIN-ST. JEOR: SCORE: 3196.81

## 2021-09-23 NOTE — LETTER
9/23/2021         RE: Jamel Louis  [address] [facility] [address]        Dear Colleague,    Thank you for referring your patient, Jamel Louis, to the [facility]. Please see a copy of my visit note below.    Chief Complaint   Patient presents with     Ear Problem     Follow up ear infection (right ear) doing better     History of Present Illness  Jamel Louis is a 50 year old male who presents today for follow-up.  I initially evaluated the patient on 7/26/2021 with a right otitis externa.  His ear was debrided in office.  He was placed on Ciprodex drops and seen for follow-up on 8/5/2021.  He had resolution of his otitis externa.    I evaluated the patient on 9/13/2021 for follow-up and audiometric assessment.  His audiogram was deferred as he had impacted ceruminous debris and otorrhea.  His ear was debrided and I took a culture.  His culture grew normal ashu and Aspergillus fumigatus.  I had powdered his ear with antifungal powder when I saw him in the office.  I figured I would see him again for examination and debridement to see how much debris he was retaining and to decide if further treatment was needed.  He returns today for repeat examination.      Since last seeing the patient, the patient reports  that his ear felt great right after we cleaned him.  Slowly over the week, he has had more plugging and the tinnitus is returned.  He denies any otalgia or chon otorrhea.  No bloody otorrhea. No dizziness or vertigo.  He feels like the hearing has improved some.  He has not had previous ear surgery.    Past Medical History  Patient Active Problem List   Diagnosis     Open abdominal wall wound, sequela     Perforation of colon (H)     Chronic gout without tophus, unspecified cause, unspecified site     Type 2 diabetes mellitus without complication, without long-term current use of insulin (H)     Tubular adenoma of colon     Morbid obesity (H)      Current Medications    Current Outpatient Medications:      clotrimazole (LOTRIMIN) 1 % external solution, 5 drops right ear twice daily for 10 days, Disp: 10 mL, Rfl: 1     ciprofloxacin-dexamethasone (CIPRODEX) 0.3-0.1 % otic suspension, Place 5 drops in draining ear twice daily for 10 days.  Call if drainage persistent past 10 days. (Patient not taking: Reported on 9/13/2021), Disp: 10 mL, Rfl: 3     indomethacin (INDOCIN) 50 MG capsule, Take 1 capsule (50 mg) by mouth 3 times daily as needed for moderate pain (Patient not taking: Reported on 9/13/2021), Disp: 90 capsule, Rfl: 1    Allergies  No Known Allergies    Social History  Social History     Socioeconomic History     Marital status:      Spouse name: Not on file     Number of children: Not on file     Years of education: Not on file     Highest education level: Not on file   Occupational History     Not on file   Tobacco Use     Smoking status: Never Smoker     Smokeless tobacco: Never Used   Substance and Sexual Activity     Alcohol use: Yes     Comment: occ     Drug use: Yes     Types: Marijuana     Sexual activity: Yes     Partners: Female   Other Topics Concern     Not on file   Social History Narrative     Not on file     Social Determinants of Health     Financial Resource Strain:      Difficulty of Paying Living Expenses:    Food Insecurity:      Worried About Running Out of Food in the Last Year:      Ran Out of Food in the Last Year:    Transportation Needs:      Lack of Transportation (Medical):      Lack of Transportation (Non-Medical):    Physical Activity:      Days of Exercise per Week:      Minutes of Exercise per Session:    Stress:      Feeling of Stress :    Social Connections:      Frequency of Communication with Friends and Family:      Frequency of Social Gatherings with Friends and Family:      Attends Roman [employer] Services:      Active Member of Clubs or Organizations:      Attends Club or Organization Meetings:      Marital  "Status:    Intimate Partner Violence:      Fear of Current or Ex-Partner:      Emotionally Abused:      Physically Abused:      Sexually Abused:        Family History  Family History   Problem Relation Age of Onset     Colon Cancer Father      Heart Disease Father      Chronic Obstructive Pulmonary Disease Father        Review of Systems  As per HPI and PMHx, otherwise 10 system review including the head and neck, constitutional, eyes, respiratory, GI, skin, neurologic, lymphatic, endocrine, and allergy systems is negative.    Physical Exam  BP (!) 175/78 (BP Location: Right arm, Patient Position: Sitting, Cuff Size: Adult Large)   Pulse 100   Temp 98.7  F (37.1  C) (Tympanic)   Ht 1.93 m (6' 4\")   Wt (!) 224.5 kg (495 lb)   BMI 60.25 kg/m    GENERAL: Patient is a pleasant, cooperative 50 year old male in no acute distress.  HEAD: Normocephalic, atraumatic.  Hair and scalp are normal.  EYES: Pupils are equal, round, reactive to light and accommodation.  Extraocular movements are intact.  The sclera nonicteric without injection.  The extraocular structures are normal.  EARS: See below.  NEUROLOGIC: Cranial nerves II through XII are grossly intact.  Voice is strong.  Facial nerve examination incomplete due to the patient wearing a mask.  CARDIOVASCULAR: Extremities are warm and well-perfused.  No significant peripheral edema.  RESPIRATORY: Patient has nonlabored breathing without cough, wheeze, stridor.  PSYCHIATRIC: Patient is alert and oriented.  Mood and affect appear normal.  SKIN: Warm and dry.  No scalp, face, or neck lesions noted.    Physical Exam and Procedure    EARS: Normal shape and symmetry.  No tenderness when palpating the mastoid or tragal areas bilaterally.  The ears were then examined under the otic binocular microscope to perform cerumen removal.  Otoscopic exam on the right reveals impacted moist ceruminous debris cerumen down to the level of the tympanic membrane.  The cerumen was removed " with 3, #5, and #7 suction.  The right tympanic membrane was round, intact without evidence of effusion.  No retraction, granulation, drainage, or evidence of cholesteatoma.    The ear was powdered with clotrimazole.    Attention was turned to the left ear.  Otoscopic exam on the left reveals impacted cerumen overlying of the tympanic membrane.  The cerumen was removed with angled pick and #5 suction.  The left tympanic membrane was round, intact without evidence of effusion.  No retraction, granulation, drainage, or evidence of cholesteatoma.      Assessment and Plan     ICD-10-CM    1. Otitis externa, fungal, right ear  B36.9 Remove Cerumen    H62.41 clotrimazole (LOTRIMIN) 1 % external solution   2. Tinnitus, bilateral  H93.13 Remove Cerumen     clotrimazole (LOTRIMIN) 1 % external solution      It was my pleasure seeing Jamel Louis today in clinic.  The patient continues to have signs of fungal otitis externa confirmed with culture.  I do think he will require an antifungal eardrop given the amount of debris that he retained over the past week.  We will start him on Lotrimin eardrops 5 drops twice daily for 10 days.  I like to see him back after drop treatment.  If this does not work, we could either get him powder for home or to think about using gentian violet.      Jamel to follow up with Primary Care provider regarding elevated blood pressure.    Armand Valdez MD  Department of Otolaryngology-Head and Neck Surgery  [facility]         Again, thank you for allowing me to participate in the care of your patient.        Sincerely,        Armand Valdez MD

## 2021-09-23 NOTE — NURSING NOTE
"Initial BP (!) 175/78 (BP Location: Right arm, Patient Position: Sitting, Cuff Size: Adult Large)   Pulse 100   Temp 98.7  F (37.1  C) (Tympanic)   Ht 1.93 m (6' 4\")   Wt (!) 224.5 kg (495 lb)   BMI 60.25 kg/m   Estimated body mass index is 60.25 kg/m  as calculated from the following:    Height as of this encounter: 1.93 m (6' 4\").    Weight as of this encounter: 224.5 kg (495 lb). .    Clara Galeano CMA    "

## 2021-09-23 NOTE — PATIENT INSTRUCTIONS
Per physician instructions      If you have questions or concerns on any instructions given to you by your provider today or if you need to schedule an appointment, you can reach us at [phone].

## 2021-10-10 ENCOUNTER — HEALTH MAINTENANCE LETTER (OUTPATIENT)
Age: 51
End: 2021-10-10

## 2021-10-11 LAB — BACTERIA SPEC CULT: ABNORMAL

## 2022-01-30 ENCOUNTER — HEALTH MAINTENANCE LETTER (OUTPATIENT)
Age: 52
End: 2022-01-30

## 2022-05-22 ENCOUNTER — HEALTH MAINTENANCE LETTER (OUTPATIENT)
Age: 52
End: 2022-05-22

## 2022-06-22 NOTE — LETTER
[facility]  [address]  [facility] [address]  [phone]       June 22, 2022    Jamel Louis  [facility] [facility] [address]        Dear Jamel,    We care about your health and have reviewed your health plan and are making recommendations based on this review, to optimize your health.    You are in particular need of attention regarding:    -Wellness (Physical) Visit   -Diabetes follow up    We are recommending that you:                                              -schedule a WELLNESS (Physical) APPOINTMENT. We will check fasting labs the same day. You should have nothing to eat except water and meds for 8-10 hours prior.   - we will also check your A1C (glucose average)     There are specific measures we need to closely follow and work on until the targets are met which will then minimize your chances of diabetic complications.  These measures and your recent results are listed below:    Test Frequency Target Your result   A1C lab (average glucose the last 2-3 months) Every 3-6 months <7   (<8 if other chronic diseases) Lab Results   Component Value Date    A1C 6.5 06/18/2021        Blood Pressure Every 3-6 months <140/90 BP Readings from Last 2 Encounters:   09/23/21 (!) 175/78   09/13/21 (!) 150/83      Eye Exam Annually          In addition, here is a list of due or overdue Health Maintenance reminders.    Health Maintenance Due   Topic Date Due     Diabetic Foot Exam  Never done     ANNUAL REVIEW OF HM ORDERS  Never done     Discuss Advance Care Planning  Never done     Eye Exam  Never done     Pneumococcal Vaccine (1 - PCV) Never done     HIV Screening  Never done     Hepatitis C Screening  Never done     Hepatitis B Vaccine (1 of 3 - Risk 3-dose series) Never done     Zoster (Shingles) Vaccine (1 of 2) Never done     COVID-19 Vaccine (2 - Booster for Helio series) 08/13/2021     A1C Lab  09/18/2021     Preventive Care Visit  12/18/2021     Basic  Metabolic Panel  12/18/2021     Cholesterol Lab  12/18/2021     Kidney Microalbumin Urine Test  12/18/2021     PHQ-2 (once per calendar year)  01/01/2022       To address the above recommendations, we encourage you to contact us at [phone], via [employer] or by contacting Central Scheduling toll free at [phone] 24 hours a day. They will assist you with finding the most convenient time and location..    Thank you for trusting [facility] and we appreciate the opportunity to serve you.  We look forward to supporting your healthcare needs in the future.    Healthy Regards,    Your [facility] Team

## 2022-06-22 NOTE — PROGRESS NOTES
Patient Quality Outreach    Patient is due for the following:   Diabetes -  A1C, Microalbumin, and Diabetic Follow-Up Visit  Physical  - DUE NOW    NEXT STEPS:   Schedule a yearly physical    Type of outreach:    Sent letter.      Questions for provider review:    None     CHRISTIANNE Yeager LPN

## 2022-09-18 ENCOUNTER — HEALTH MAINTENANCE LETTER (OUTPATIENT)
Age: 52
End: 2022-09-18

## 2023-01-29 ENCOUNTER — HEALTH MAINTENANCE LETTER (OUTPATIENT)
Age: 53
End: 2023-01-29

## 2023-05-07 ENCOUNTER — HEALTH MAINTENANCE LETTER (OUTPATIENT)
Age: 53
End: 2023-05-07

## 2023-10-08 ENCOUNTER — HEALTH MAINTENANCE LETTER (OUTPATIENT)
Age: 53
End: 2023-10-08

## 2024-02-25 ENCOUNTER — HEALTH MAINTENANCE LETTER (OUTPATIENT)
Age: 54
End: 2024-02-25

## 2024-07-14 ENCOUNTER — HEALTH MAINTENANCE LETTER (OUTPATIENT)
Age: 54
End: 2024-07-14

## 2024-07-19 ENCOUNTER — HOSPITAL ENCOUNTER (OUTPATIENT)
Facility: CLINIC | Age: 54
End: 2024-07-19
Attending: INTERNAL MEDICINE | Admitting: INTERNAL MEDICINE